# Patient Record
Sex: FEMALE | Race: WHITE | NOT HISPANIC OR LATINO | URBAN - METROPOLITAN AREA
[De-identification: names, ages, dates, MRNs, and addresses within clinical notes are randomized per-mention and may not be internally consistent; named-entity substitution may affect disease eponyms.]

---

## 2022-01-01 ENCOUNTER — INPATIENT (INPATIENT)
Facility: HOSPITAL | Age: 62
LOS: 8 days | End: 2022-04-19
Attending: HOSPITALIST | Admitting: HOSPITALIST
Payer: COMMERCIAL

## 2022-01-01 VITALS
RESPIRATION RATE: 18 BRPM | OXYGEN SATURATION: 99 % | DIASTOLIC BLOOD PRESSURE: 43 MMHG | HEART RATE: 104 BPM | SYSTOLIC BLOOD PRESSURE: 78 MMHG

## 2022-01-01 VITALS
TEMPERATURE: 96 F | SYSTOLIC BLOOD PRESSURE: 106 MMHG | DIASTOLIC BLOOD PRESSURE: 53 MMHG | HEART RATE: 103 BPM | RESPIRATION RATE: 18 BRPM

## 2022-01-01 DIAGNOSIS — R52 PAIN, UNSPECIFIED: ICD-10-CM

## 2022-01-01 DIAGNOSIS — C25.9 MALIGNANT NEOPLASM OF PANCREAS, UNSPECIFIED: ICD-10-CM

## 2022-01-01 DIAGNOSIS — R06.03 ACUTE RESPIRATORY DISTRESS: ICD-10-CM

## 2022-01-01 DIAGNOSIS — Z51.5 ENCOUNTER FOR PALLIATIVE CARE: ICD-10-CM

## 2022-01-01 DIAGNOSIS — K91.89 OTHER POSTPROCEDURAL COMPLICATIONS AND DISORDERS OF DIGESTIVE SYSTEM: ICD-10-CM

## 2022-01-01 DIAGNOSIS — Z66 DO NOT RESUSCITATE: ICD-10-CM
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
## 2022-01-01 DIAGNOSIS — J98.11 ATELECTASIS: ICD-10-CM

## 2022-01-01 DIAGNOSIS — J90 PLEURAL EFFUSION, NOT ELSEWHERE CLASSIFIED: ICD-10-CM

## 2022-01-01 DIAGNOSIS — R77.8 OTHER SPECIFIED ABNORMALITIES OF PLASMA PROTEINS: ICD-10-CM

## 2022-01-01 DIAGNOSIS — E87.6 HYPOKALEMIA: ICD-10-CM

## 2022-01-01 DIAGNOSIS — D72.825 BANDEMIA: ICD-10-CM

## 2022-01-01 DIAGNOSIS — K52.9 NONINFECTIVE GASTROENTERITIS AND COLITIS, UNSPECIFIED: ICD-10-CM

## 2022-01-01 DIAGNOSIS — R74.02 ELEVATION OF LEVELS OF LACTIC ACID DEHYDROGENASE [LDH]: ICD-10-CM

## 2022-01-01 DIAGNOSIS — E83.42 HYPOMAGNESEMIA: ICD-10-CM

## 2022-01-01 DIAGNOSIS — R64 CACHEXIA: ICD-10-CM

## 2022-01-01 DIAGNOSIS — J81.1 CHRONIC PULMONARY EDEMA: ICD-10-CM

## 2022-01-01 DIAGNOSIS — E43 UNSPECIFIED SEVERE PROTEIN-CALORIE MALNUTRITION: ICD-10-CM

## 2022-01-01 DIAGNOSIS — C78.7 SECONDARY MALIGNANT NEOPLASM OF LIVER AND INTRAHEPATIC BILE DUCT: ICD-10-CM

## 2022-01-01 DIAGNOSIS — E87.8 OTHER DISORDERS OF ELECTROLYTE AND FLUID BALANCE, NOT ELSEWHERE CLASSIFIED: ICD-10-CM

## 2022-01-01 DIAGNOSIS — D69.6 THROMBOCYTOPENIA, UNSPECIFIED: ICD-10-CM

## 2022-01-01 DIAGNOSIS — H40.9 UNSPECIFIED GLAUCOMA: ICD-10-CM

## 2022-01-01 DIAGNOSIS — K63.1 PERFORATION OF INTESTINE (NONTRAUMATIC): ICD-10-CM

## 2022-01-01 DIAGNOSIS — R18.8 OTHER ASCITES: ICD-10-CM
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
## 2022-01-01 DIAGNOSIS — K66.8 OTHER SPECIFIED DISORDERS OF PERITONEUM: ICD-10-CM

## 2022-01-01 DIAGNOSIS — E87.0 HYPEROSMOLALITY AND HYPERNATREMIA: ICD-10-CM

## 2022-01-01 DIAGNOSIS — Z71.89 OTHER SPECIFIED COUNSELING: ICD-10-CM

## 2022-01-01 LAB
ALBUMIN SERPL ELPH-MCNC: 2.4 G/DL — LOW (ref 3.5–5.2)
ALBUMIN SERPL ELPH-MCNC: 2.5 G/DL — LOW (ref 3.5–5.2)
ALBUMIN SERPL ELPH-MCNC: 2.6 G/DL — LOW (ref 3.5–5.2)
ALBUMIN SERPL ELPH-MCNC: 2.7 G/DL — LOW (ref 3.5–5.2)
ALP SERPL-CCNC: 116 U/L — HIGH (ref 30–115)
ALP SERPL-CCNC: 134 U/L — HIGH (ref 30–115)
ALP SERPL-CCNC: 157 U/L — HIGH (ref 30–115)
ALP SERPL-CCNC: 166 U/L — HIGH (ref 30–115)
ALT FLD-CCNC: 60 U/L — HIGH (ref 0–41)
ALT FLD-CCNC: 69 U/L — HIGH (ref 0–41)
ALT FLD-CCNC: 70 U/L — HIGH (ref 0–41)
ALT FLD-CCNC: 77 U/L — HIGH (ref 0–41)
ANION GAP SERPL CALC-SCNC: 12 MMOL/L — SIGNIFICANT CHANGE UP (ref 7–14)
ANION GAP SERPL CALC-SCNC: 13 MMOL/L — SIGNIFICANT CHANGE UP (ref 7–14)
ANION GAP SERPL CALC-SCNC: 15 MMOL/L — HIGH (ref 7–14)
ANION GAP SERPL CALC-SCNC: 18 MMOL/L — HIGH (ref 7–14)
APPEARANCE UR: CLEAR — SIGNIFICANT CHANGE UP
APTT BLD: 26 SEC — LOW (ref 27–39.2)
APTT BLD: 27.7 SEC — SIGNIFICANT CHANGE UP (ref 27–39.2)
APTT BLD: 29.3 SEC — SIGNIFICANT CHANGE UP (ref 27–39.2)
AST SERPL-CCNC: 23 U/L — SIGNIFICANT CHANGE UP (ref 0–41)
AST SERPL-CCNC: 26 U/L — SIGNIFICANT CHANGE UP (ref 0–41)
AST SERPL-CCNC: 35 U/L — SIGNIFICANT CHANGE UP (ref 0–41)
AST SERPL-CCNC: 67 U/L — HIGH (ref 0–41)
BACTERIA # UR AUTO: ABNORMAL
BASE EXCESS BLDA CALC-SCNC: -4 MMOL/L — LOW (ref -2–3)
BASE EXCESS BLDV CALC-SCNC: -3.1 MMOL/L — LOW (ref -2–3)
BASE EXCESS BLDV CALC-SCNC: -4.3 MMOL/L — LOW (ref -2–3)
BASOPHILS # BLD AUTO: 0.06 K/UL — SIGNIFICANT CHANGE UP (ref 0–0.2)
BASOPHILS # BLD AUTO: 0.07 K/UL — SIGNIFICANT CHANGE UP (ref 0–0.2)
BASOPHILS NFR BLD AUTO: 0.3 % — SIGNIFICANT CHANGE UP (ref 0–1)
BASOPHILS NFR BLD AUTO: 0.6 % — SIGNIFICANT CHANGE UP (ref 0–1)
BILIRUB SERPL-MCNC: 1.3 MG/DL — HIGH (ref 0.2–1.2)
BILIRUB SERPL-MCNC: 1.4 MG/DL — HIGH (ref 0.2–1.2)
BILIRUB SERPL-MCNC: 1.8 MG/DL — HIGH (ref 0.2–1.2)
BILIRUB SERPL-MCNC: 1.9 MG/DL — HIGH (ref 0.2–1.2)
BILIRUB UR-MCNC: ABNORMAL
BLD GP AB SCN SERPL QL: SIGNIFICANT CHANGE UP
BUN SERPL-MCNC: 11 MG/DL — SIGNIFICANT CHANGE UP (ref 10–20)
BUN SERPL-MCNC: 18 MG/DL — SIGNIFICANT CHANGE UP (ref 10–20)
BUN SERPL-MCNC: 29 MG/DL — HIGH (ref 10–20)
BUN SERPL-MCNC: 54 MG/DL — HIGH (ref 10–20)
BUN SERPL-MCNC: 82 MG/DL — CRITICAL HIGH (ref 10–20)
BUN SERPL-MCNC: 90 MG/DL — CRITICAL HIGH (ref 10–20)
BURR CELLS BLD QL SMEAR: PRESENT — SIGNIFICANT CHANGE UP
C DIFF BY PCR RESULT: NEGATIVE — SIGNIFICANT CHANGE UP
C DIFF TOX GENS STL QL NAA+PROBE: SIGNIFICANT CHANGE UP
CA-I SERPL-SCNC: 1.07 MMOL/L — LOW (ref 1.15–1.33)
CALCIUM SERPL-MCNC: 7.8 MG/DL — LOW (ref 8.5–10.1)
CALCIUM SERPL-MCNC: 7.9 MG/DL — LOW (ref 8.5–10.1)
CALCIUM SERPL-MCNC: 7.9 MG/DL — LOW (ref 8.5–10.1)
CALCIUM SERPL-MCNC: 8 MG/DL — LOW (ref 8.5–10.1)
CALCIUM SERPL-MCNC: 8.2 MG/DL — LOW (ref 8.5–10.1)
CALCIUM SERPL-MCNC: 8.4 MG/DL — LOW (ref 8.5–10.1)
CHLORIDE SERPL-SCNC: 107 MMOL/L — SIGNIFICANT CHANGE UP (ref 98–110)
CHLORIDE SERPL-SCNC: 112 MMOL/L — HIGH (ref 98–110)
CHLORIDE SERPL-SCNC: 116 MMOL/L — HIGH (ref 98–110)
CHLORIDE SERPL-SCNC: 117 MMOL/L — HIGH (ref 98–110)
CHLORIDE SERPL-SCNC: 118 MMOL/L — HIGH (ref 98–110)
CHLORIDE SERPL-SCNC: 99 MMOL/L — SIGNIFICANT CHANGE UP (ref 98–110)
CO2 SERPL-SCNC: 18 MMOL/L — SIGNIFICANT CHANGE UP (ref 17–32)
CO2 SERPL-SCNC: 18 MMOL/L — SIGNIFICANT CHANGE UP (ref 17–32)
CO2 SERPL-SCNC: 19 MMOL/L — SIGNIFICANT CHANGE UP (ref 17–32)
CO2 SERPL-SCNC: 20 MMOL/L — SIGNIFICANT CHANGE UP (ref 17–32)
CO2 SERPL-SCNC: 20 MMOL/L — SIGNIFICANT CHANGE UP (ref 17–32)
CO2 SERPL-SCNC: 21 MMOL/L — SIGNIFICANT CHANGE UP (ref 17–32)
COLOR SPEC: YELLOW — SIGNIFICANT CHANGE UP
CREAT SERPL-MCNC: 0.5 MG/DL — LOW (ref 0.7–1.5)
CREAT SERPL-MCNC: 0.6 MG/DL — LOW (ref 0.7–1.5)
CREAT SERPL-MCNC: 0.6 MG/DL — LOW (ref 0.7–1.5)
CREAT SERPL-MCNC: 0.7 MG/DL — SIGNIFICANT CHANGE UP (ref 0.7–1.5)
CREAT SERPL-MCNC: 1 MG/DL — SIGNIFICANT CHANGE UP (ref 0.7–1.5)
CREAT SERPL-MCNC: 3 MG/DL — HIGH (ref 0.7–1.5)
CULTURE RESULTS: SIGNIFICANT CHANGE UP
CULTURE RESULTS: SIGNIFICANT CHANGE UP
DIFF PNL FLD: ABNORMAL
DIR ANTIGLOB POLYSPECIFIC INTERPRETATION: SIGNIFICANT CHANGE UP
EGFR: 100 ML/MIN/1.73M2 — SIGNIFICANT CHANGE UP
EGFR: 100 ML/MIN/1.73M2 — SIGNIFICANT CHANGE UP
EGFR: 105 ML/MIN/1.73M2 — SIGNIFICANT CHANGE UP
EGFR: 17 ML/MIN/1.73M2 — LOW
EGFR: 63 ML/MIN/1.73M2 — SIGNIFICANT CHANGE UP
EGFR: 97 ML/MIN/1.73M2 — SIGNIFICANT CHANGE UP
EOSINOPHIL # BLD AUTO: 0.01 K/UL — SIGNIFICANT CHANGE UP (ref 0–0.7)
EOSINOPHIL # BLD AUTO: 0.04 K/UL — SIGNIFICANT CHANGE UP (ref 0–0.7)
EOSINOPHIL NFR BLD AUTO: 0.1 % — SIGNIFICANT CHANGE UP (ref 0–8)
EOSINOPHIL NFR BLD AUTO: 0.2 % — SIGNIFICANT CHANGE UP (ref 0–8)
EPI CELLS # UR: ABNORMAL /HPF
FERRITIN SERPL-MCNC: 914 NG/ML — HIGH (ref 15–150)
GAS PNL BLDV: 134 MMOL/L — LOW (ref 136–145)
GAS PNL BLDV: SIGNIFICANT CHANGE UP
GLUCOSE SERPL-MCNC: 102 MG/DL — HIGH (ref 70–99)
GLUCOSE SERPL-MCNC: 121 MG/DL — HIGH (ref 70–99)
GLUCOSE SERPL-MCNC: 125 MG/DL — HIGH (ref 70–99)
GLUCOSE SERPL-MCNC: 137 MG/DL — HIGH (ref 70–99)
GLUCOSE SERPL-MCNC: 154 MG/DL — HIGH (ref 70–99)
GLUCOSE SERPL-MCNC: 180 MG/DL — HIGH (ref 70–99)
GLUCOSE UR QL: 100 MG/DL
HAPTOGLOB SERPL-MCNC: 197 MG/DL — SIGNIFICANT CHANGE UP (ref 34–200)
HCO3 BLDA-SCNC: 20 MMOL/L — LOW (ref 21–28)
HCO3 BLDV-SCNC: 23 MMOL/L — SIGNIFICANT CHANGE UP (ref 22–29)
HCO3 BLDV-SCNC: 24 MMOL/L — SIGNIFICANT CHANGE UP (ref 22–29)
HCT VFR BLD CALC: 32.6 % — LOW (ref 37–47)
HCT VFR BLD CALC: 33.4 % — LOW (ref 37–47)
HCT VFR BLD CALC: 33.8 % — LOW (ref 37–47)
HCT VFR BLD CALC: 34.7 % — LOW (ref 37–47)
HCT VFR BLD CALC: 34.9 % — LOW (ref 37–47)
HCT VFR BLD CALC: 39.6 % — SIGNIFICANT CHANGE UP (ref 37–47)
HCT VFR BLDA CALC: 42 % — SIGNIFICANT CHANGE UP (ref 39–51)
HCV AB S/CO SERPL IA: 0.04 COI — SIGNIFICANT CHANGE UP
HCV AB SERPL-IMP: SIGNIFICANT CHANGE UP
HEPARIN-PF4 AB RESULT: <0.6 U/ML — SIGNIFICANT CHANGE UP (ref 0–0.9)
HGB BLD CALC-MCNC: 14 G/DL — SIGNIFICANT CHANGE UP (ref 12.6–17.4)
HGB BLD-MCNC: 10.7 G/DL — LOW (ref 12–16)
HGB BLD-MCNC: 10.9 G/DL — LOW (ref 12–16)
HGB BLD-MCNC: 10.9 G/DL — LOW (ref 12–16)
HGB BLD-MCNC: 11.3 G/DL — LOW (ref 12–16)
HGB BLD-MCNC: 11.4 G/DL — LOW (ref 12–16)
HGB BLD-MCNC: 12.6 G/DL — SIGNIFICANT CHANGE UP (ref 12–16)
HOROWITZ INDEX BLDA+IHG-RTO: 100 — SIGNIFICANT CHANGE UP
IMM GRANULOCYTES NFR BLD AUTO: 0.5 % — HIGH (ref 0.1–0.3)
IMM GRANULOCYTES NFR BLD AUTO: 2.1 % — HIGH (ref 0.1–0.3)
INR BLD: 1.76 RATIO — HIGH (ref 0.65–1.3)
INR BLD: 1.88 RATIO — HIGH (ref 0.65–1.3)
INR BLD: 2 RATIO — HIGH (ref 0.65–1.3)
IRON SATN MFR SERPL: 37 % — SIGNIFICANT CHANGE UP (ref 15–50)
IRON SATN MFR SERPL: 59 UG/DL — SIGNIFICANT CHANGE UP (ref 35–150)
KETONES UR-MCNC: NEGATIVE — SIGNIFICANT CHANGE UP
LACTATE BLDV-MCNC: 3.9 MMOL/L — HIGH (ref 0.5–2)
LACTATE BLDV-MCNC: 4.7 MMOL/L — CRITICAL HIGH (ref 0.5–2)
LACTATE SERPL-SCNC: 1.1 MMOL/L — SIGNIFICANT CHANGE UP (ref 0.7–2)
LACTATE SERPL-SCNC: 1.8 MMOL/L — SIGNIFICANT CHANGE UP (ref 0.7–2)
LDH SERPL L TO P-CCNC: 545 — HIGH (ref 50–242)
LEUKOCYTE ESTERASE UR-ACNC: NEGATIVE — SIGNIFICANT CHANGE UP
LIDOCAIN IGE QN: 91 U/L — HIGH (ref 7–60)
LYMPHOCYTES # BLD AUTO: 0.59 K/UL — LOW (ref 1.2–3.4)
LYMPHOCYTES # BLD AUTO: 1.07 K/UL — LOW (ref 1.2–3.4)
LYMPHOCYTES # BLD AUTO: 4.5 % — LOW (ref 20.5–51.1)
LYMPHOCYTES # BLD AUTO: 5.4 % — LOW (ref 20.5–51.1)
MAGNESIUM SERPL-MCNC: 1.6 MG/DL — LOW (ref 1.8–2.4)
MAGNESIUM SERPL-MCNC: 1.8 MG/DL — SIGNIFICANT CHANGE UP (ref 1.8–2.4)
MCHC RBC-ENTMCNC: 30.1 PG — SIGNIFICANT CHANGE UP (ref 27–31)
MCHC RBC-ENTMCNC: 30.2 PG — SIGNIFICANT CHANGE UP (ref 27–31)
MCHC RBC-ENTMCNC: 30.4 PG — SIGNIFICANT CHANGE UP (ref 27–31)
MCHC RBC-ENTMCNC: 30.4 PG — SIGNIFICANT CHANGE UP (ref 27–31)
MCHC RBC-ENTMCNC: 30.6 PG — SIGNIFICANT CHANGE UP (ref 27–31)
MCHC RBC-ENTMCNC: 30.7 PG — SIGNIFICANT CHANGE UP (ref 27–31)
MCHC RBC-ENTMCNC: 31.8 G/DL — LOW (ref 32–37)
MCHC RBC-ENTMCNC: 32.2 G/DL — SIGNIFICANT CHANGE UP (ref 32–37)
MCHC RBC-ENTMCNC: 32.4 G/DL — SIGNIFICANT CHANGE UP (ref 32–37)
MCHC RBC-ENTMCNC: 32.6 G/DL — SIGNIFICANT CHANGE UP (ref 32–37)
MCHC RBC-ENTMCNC: 32.8 G/DL — SIGNIFICANT CHANGE UP (ref 32–37)
MCHC RBC-ENTMCNC: 32.9 G/DL — SIGNIFICANT CHANGE UP (ref 32–37)
MCV RBC AUTO: 92 FL — SIGNIFICANT CHANGE UP (ref 81–99)
MCV RBC AUTO: 92.6 FL — SIGNIFICANT CHANGE UP (ref 81–99)
MCV RBC AUTO: 93.8 FL — SIGNIFICANT CHANGE UP (ref 81–99)
MCV RBC AUTO: 94.4 FL — SIGNIFICANT CHANGE UP (ref 81–99)
MCV RBC AUTO: 94.5 FL — SIGNIFICANT CHANGE UP (ref 81–99)
MCV RBC AUTO: 94.8 FL — SIGNIFICANT CHANGE UP (ref 81–99)
MONOCYTES # BLD AUTO: 0.48 K/UL — SIGNIFICANT CHANGE UP (ref 0.1–0.6)
MONOCYTES # BLD AUTO: 0.7 K/UL — HIGH (ref 0.1–0.6)
MONOCYTES NFR BLD AUTO: 2.9 % — SIGNIFICANT CHANGE UP (ref 1.7–9.3)
MONOCYTES NFR BLD AUTO: 4.4 % — SIGNIFICANT CHANGE UP (ref 1.7–9.3)
NEUTROPHILS # BLD AUTO: 21.43 K/UL — HIGH (ref 1.4–6.5)
NEUTROPHILS # BLD AUTO: 9.67 K/UL — HIGH (ref 1.4–6.5)
NEUTROPHILS NFR BLD AUTO: 89 % — HIGH (ref 42.2–75.2)
NEUTROPHILS NFR BLD AUTO: 90 % — HIGH (ref 42.2–75.2)
NEUTS BAND # BLD: 20 % — HIGH (ref 0–6)
NITRITE UR-MCNC: NEGATIVE — SIGNIFICANT CHANGE UP
NRBC # BLD: 0 /100 WBCS — SIGNIFICANT CHANGE UP (ref 0–0)
NRBC # BLD: 0 /100 — SIGNIFICANT CHANGE UP (ref 0–0)
PCO2 BLDA: 31 MMHG — LOW (ref 35–48)
PCO2 BLDV: 50 MMHG — HIGH (ref 39–42)
PCO2 BLDV: 52 MMHG — HIGH (ref 39–42)
PF4 HEPARIN CMPLX AB SER-ACNC: NEGATIVE — SIGNIFICANT CHANGE UP
PH BLDA: 7.41 — SIGNIFICANT CHANGE UP (ref 7.35–7.45)
PH BLDV: 7.26 — LOW (ref 7.32–7.43)
PH BLDV: 7.29 — LOW (ref 7.32–7.43)
PH UR: 6 — SIGNIFICANT CHANGE UP (ref 5–8)
PHOSPHATE SERPL-MCNC: 2.6 MG/DL — SIGNIFICANT CHANGE UP (ref 2.1–4.9)
PLAT MORPH BLD: NORMAL — SIGNIFICANT CHANGE UP
PLATELET # BLD AUTO: 124 K/UL — LOW (ref 130–400)
PLATELET # BLD AUTO: 23 K/UL — LOW (ref 130–400)
PLATELET # BLD AUTO: 27 K/UL — LOW (ref 130–400)
PLATELET # BLD AUTO: 43 K/UL — LOW (ref 130–400)
PLATELET # BLD AUTO: 53 K/UL — LOW (ref 130–400)
PLATELET # BLD AUTO: 75 K/UL — LOW (ref 130–400)
PO2 BLDA: 94 MMHG — SIGNIFICANT CHANGE UP (ref 83–108)
PO2 BLDV: 18 MMHG — SIGNIFICANT CHANGE UP
PO2 BLDV: 31 MMHG — SIGNIFICANT CHANGE UP
POTASSIUM BLDV-SCNC: 3.2 MMOL/L — LOW (ref 3.5–5.1)
POTASSIUM SERPL-MCNC: 3 MMOL/L — LOW (ref 3.5–5)
POTASSIUM SERPL-MCNC: 3.2 MMOL/L — LOW (ref 3.5–5)
POTASSIUM SERPL-MCNC: 3.4 MMOL/L — LOW (ref 3.5–5)
POTASSIUM SERPL-MCNC: 3.7 MMOL/L — SIGNIFICANT CHANGE UP (ref 3.5–5)
POTASSIUM SERPL-MCNC: 3.7 MMOL/L — SIGNIFICANT CHANGE UP (ref 3.5–5)
POTASSIUM SERPL-MCNC: 4.2 MMOL/L — SIGNIFICANT CHANGE UP (ref 3.5–5)
POTASSIUM SERPL-SCNC: 3 MMOL/L — LOW (ref 3.5–5)
POTASSIUM SERPL-SCNC: 3.2 MMOL/L — LOW (ref 3.5–5)
POTASSIUM SERPL-SCNC: 3.4 MMOL/L — LOW (ref 3.5–5)
POTASSIUM SERPL-SCNC: 3.7 MMOL/L — SIGNIFICANT CHANGE UP (ref 3.5–5)
POTASSIUM SERPL-SCNC: 3.7 MMOL/L — SIGNIFICANT CHANGE UP (ref 3.5–5)
POTASSIUM SERPL-SCNC: 4.2 MMOL/L — SIGNIFICANT CHANGE UP (ref 3.5–5)
PROT SERPL-MCNC: 4.4 G/DL — LOW (ref 6–8)
PROT SERPL-MCNC: 4.7 G/DL — LOW (ref 6–8)
PROT SERPL-MCNC: 5 G/DL — LOW (ref 6–8)
PROT SERPL-MCNC: 5.2 G/DL — LOW (ref 6–8)
PROT UR-MCNC: 100 MG/DL
PROTHROM AB SERPL-ACNC: 20.1 SEC — HIGH (ref 9.95–12.87)
PROTHROM AB SERPL-ACNC: 21.5 SEC — HIGH (ref 9.95–12.87)
PROTHROM AB SERPL-ACNC: 22.8 SEC — HIGH (ref 9.95–12.87)
RBC # BLD: 3.52 M/UL — LOW (ref 4.2–5.4)
RBC # BLD: 3.56 M/UL — LOW (ref 4.2–5.4)
RBC # BLD: 3.58 M/UL — LOW (ref 4.2–5.4)
RBC # BLD: 3.58 M/UL — LOW (ref 4.2–5.4)
RBC # BLD: 3.68 M/UL — LOW (ref 4.2–5.4)
RBC # BLD: 3.77 M/UL — LOW (ref 4.2–5.4)
RBC # BLD: 4.19 M/UL — LOW (ref 4.2–5.4)
RBC # FLD: 14 % — SIGNIFICANT CHANGE UP (ref 11.5–14.5)
RBC # FLD: 14 % — SIGNIFICANT CHANGE UP (ref 11.5–14.5)
RBC # FLD: 14.1 % — SIGNIFICANT CHANGE UP (ref 11.5–14.5)
RBC # FLD: 14.2 % — SIGNIFICANT CHANGE UP (ref 11.5–14.5)
RBC # FLD: 14.2 % — SIGNIFICANT CHANGE UP (ref 11.5–14.5)
RBC # FLD: 14.6 % — HIGH (ref 11.5–14.5)
RBC BLD AUTO: ABNORMAL
RBC CASTS # UR COMP ASSIST: ABNORMAL /HPF
RETICS #: 59.8 K/UL — SIGNIFICANT CHANGE UP (ref 25–125)
RETICS/RBC NFR: 1.7 % — HIGH (ref 0.5–1.5)
SAO2 % BLDA: 99.1 % — HIGH (ref 94–98)
SAO2 % BLDV: 19.5 % — SIGNIFICANT CHANGE UP
SAO2 % BLDV: 44.5 % — SIGNIFICANT CHANGE UP
SARS-COV-2 RNA SPEC QL NAA+PROBE: SIGNIFICANT CHANGE UP
SODIUM SERPL-SCNC: 137 MMOL/L — SIGNIFICANT CHANGE UP (ref 135–146)
SODIUM SERPL-SCNC: 139 MMOL/L — SIGNIFICANT CHANGE UP (ref 135–146)
SODIUM SERPL-SCNC: 145 MMOL/L — SIGNIFICANT CHANGE UP (ref 135–146)
SODIUM SERPL-SCNC: 147 MMOL/L — HIGH (ref 135–146)
SODIUM SERPL-SCNC: 148 MMOL/L — HIGH (ref 135–146)
SODIUM SERPL-SCNC: 151 MMOL/L — HIGH (ref 135–146)
SP GR SPEC: 1.01 — SIGNIFICANT CHANGE UP (ref 1.01–1.03)
SPECIMEN SOURCE: SIGNIFICANT CHANGE UP
SPECIMEN SOURCE: SIGNIFICANT CHANGE UP
SRA INTERP SER-IMP: SIGNIFICANT CHANGE UP
TIBC SERPL-MCNC: 159 UG/DL — LOW (ref 220–430)
TROPONIN T SERPL-MCNC: 0.02 NG/ML — HIGH
UIBC SERPL-MCNC: 100 UG/DL — LOW (ref 110–370)
UROBILINOGEN FLD QL: 0.2 MG/DL — SIGNIFICANT CHANGE UP
VARIANT LYMPHS # BLD: 3 % — SIGNIFICANT CHANGE UP (ref 0–5)
WBC # BLD: 10.86 K/UL — HIGH (ref 4.8–10.8)
WBC # BLD: 11.36 K/UL — HIGH (ref 4.8–10.8)
WBC # BLD: 12.02 K/UL — HIGH (ref 4.8–10.8)
WBC # BLD: 13.68 K/UL — HIGH (ref 4.8–10.8)
WBC # BLD: 19.91 K/UL — HIGH (ref 4.8–10.8)
WBC # BLD: 23.8 K/UL — HIGH (ref 4.8–10.8)
WBC # FLD AUTO: 10.86 K/UL — HIGH (ref 4.8–10.8)
WBC # FLD AUTO: 11.36 K/UL — HIGH (ref 4.8–10.8)
WBC # FLD AUTO: 12.02 K/UL — HIGH (ref 4.8–10.8)
WBC # FLD AUTO: 13.68 K/UL — HIGH (ref 4.8–10.8)
WBC # FLD AUTO: 19.91 K/UL — HIGH (ref 4.8–10.8)
WBC # FLD AUTO: 23.8 K/UL — HIGH (ref 4.8–10.8)
WBC UR QL: ABNORMAL /HPF

## 2022-01-01 PROCEDURE — 74176 CT ABD & PELVIS W/O CONTRAST: CPT | Mod: 26

## 2022-01-01 PROCEDURE — 99223 1ST HOSP IP/OBS HIGH 75: CPT

## 2022-01-01 PROCEDURE — 99232 SBSQ HOSP IP/OBS MODERATE 35: CPT

## 2022-01-01 PROCEDURE — 78306 BONE IMAGING WHOLE BODY: CPT | Mod: 26

## 2022-01-01 PROCEDURE — 74018 RADEX ABDOMEN 1 VIEW: CPT | Mod: 26

## 2022-01-01 PROCEDURE — 71045 X-RAY EXAM CHEST 1 VIEW: CPT | Mod: 26

## 2022-01-01 PROCEDURE — 71260 CT THORAX DX C+: CPT | Mod: 26

## 2022-01-01 PROCEDURE — 99285 EMERGENCY DEPT VISIT HI MDM: CPT

## 2022-01-01 PROCEDURE — 99222 1ST HOSP IP/OBS MODERATE 55: CPT

## 2022-01-01 PROCEDURE — 99221 1ST HOSP IP/OBS SF/LOW 40: CPT

## 2022-01-01 PROCEDURE — 93970 EXTREMITY STUDY: CPT | Mod: 26

## 2022-01-01 PROCEDURE — 93010 ELECTROCARDIOGRAM REPORT: CPT

## 2022-01-01 PROCEDURE — 99233 SBSQ HOSP IP/OBS HIGH 50: CPT

## 2022-01-01 PROCEDURE — 78803 RP LOCLZJ TUM SPECT 1 AREA: CPT | Mod: 26

## 2022-01-01 PROCEDURE — 74177 CT ABD & PELVIS W/CONTRAST: CPT | Mod: 26,MA

## 2022-01-01 RX ORDER — MAGNESIUM SULFATE 500 MG/ML
2 VIAL (ML) INJECTION ONCE
Refills: 0 | Status: COMPLETED | OUTPATIENT
Start: 2022-01-01 | End: 2022-01-01

## 2022-01-01 RX ORDER — HYDROMORPHONE HYDROCHLORIDE 2 MG/ML
1 INJECTION INTRAMUSCULAR; INTRAVENOUS; SUBCUTANEOUS EVERY 4 HOURS
Refills: 0 | Status: DISCONTINUED | OUTPATIENT
Start: 2022-01-01 | End: 2022-01-01

## 2022-01-01 RX ORDER — CEFEPIME 1 G/1
2000 INJECTION, POWDER, FOR SOLUTION INTRAMUSCULAR; INTRAVENOUS ONCE
Refills: 0 | Status: COMPLETED | OUTPATIENT
Start: 2022-01-01 | End: 2022-01-01

## 2022-01-01 RX ORDER — VANCOMYCIN HCL 1 G
1000 VIAL (EA) INTRAVENOUS ONCE
Refills: 0 | Status: COMPLETED | OUTPATIENT
Start: 2022-01-01 | End: 2022-01-01

## 2022-01-01 RX ORDER — PANTOPRAZOLE SODIUM 20 MG/1
40 TABLET, DELAYED RELEASE ORAL EVERY 12 HOURS
Refills: 0 | Status: DISCONTINUED | OUTPATIENT
Start: 2022-01-01 | End: 2022-01-01

## 2022-01-01 RX ORDER — IOHEXOL 300 MG/ML
30 INJECTION, SOLUTION INTRAVENOUS ONCE
Refills: 0 | Status: COMPLETED | OUTPATIENT
Start: 2022-01-01 | End: 2022-01-01

## 2022-01-01 RX ORDER — TEMAZEPAM 15 MG/1
15 CAPSULE ORAL AT BEDTIME
Refills: 0 | Status: DISCONTINUED | OUTPATIENT
Start: 2022-01-01 | End: 2022-01-01

## 2022-01-01 RX ORDER — ONDANSETRON 8 MG/1
4 TABLET, FILM COATED ORAL EVERY 6 HOURS
Refills: 0 | Status: DISCONTINUED | OUTPATIENT
Start: 2022-01-01 | End: 2022-01-01

## 2022-01-01 RX ORDER — LATANOPROST 0.05 MG/ML
1 SOLUTION/ DROPS OPHTHALMIC; TOPICAL AT BEDTIME
Refills: 0 | Status: DISCONTINUED | OUTPATIENT
Start: 2022-01-01 | End: 2022-01-01

## 2022-01-01 RX ORDER — SODIUM CHLORIDE 9 MG/ML
1000 INJECTION INTRAMUSCULAR; INTRAVENOUS; SUBCUTANEOUS ONCE
Refills: 0 | Status: COMPLETED | OUTPATIENT
Start: 2022-01-01 | End: 2022-01-01

## 2022-01-01 RX ORDER — FUROSEMIDE 40 MG
40 TABLET ORAL ONCE
Refills: 0 | Status: COMPLETED | OUTPATIENT
Start: 2022-01-01 | End: 2022-01-01

## 2022-01-01 RX ORDER — POTASSIUM CHLORIDE 20 MEQ
20 PACKET (EA) ORAL
Refills: 0 | Status: COMPLETED | OUTPATIENT
Start: 2022-01-01 | End: 2022-01-01

## 2022-01-01 RX ORDER — VANCOMYCIN HCL 1 G
750 VIAL (EA) INTRAVENOUS EVERY 24 HOURS
Refills: 0 | Status: DISCONTINUED | OUTPATIENT
Start: 2022-01-01 | End: 2022-01-01

## 2022-01-01 RX ORDER — SODIUM CHLORIDE 9 MG/ML
1000 INJECTION INTRAMUSCULAR; INTRAVENOUS; SUBCUTANEOUS
Refills: 0 | Status: DISCONTINUED | OUTPATIENT
Start: 2022-01-01 | End: 2022-01-01

## 2022-01-01 RX ORDER — MORPHINE SULFATE 50 MG/1
2 CAPSULE, EXTENDED RELEASE ORAL ONCE
Refills: 0 | Status: DISCONTINUED | OUTPATIENT
Start: 2022-01-01 | End: 2022-01-01

## 2022-01-01 RX ORDER — HYDROMORPHONE HYDROCHLORIDE 2 MG/ML
2 INJECTION INTRAMUSCULAR; INTRAVENOUS; SUBCUTANEOUS EVERY 6 HOURS
Refills: 0 | Status: DISCONTINUED | OUTPATIENT
Start: 2022-01-01 | End: 2022-01-01

## 2022-01-01 RX ORDER — HYDROMORPHONE HYDROCHLORIDE 2 MG/ML
1 INJECTION INTRAMUSCULAR; INTRAVENOUS; SUBCUTANEOUS
Refills: 0 | Status: DISCONTINUED | OUTPATIENT
Start: 2022-01-01 | End: 2022-01-01

## 2022-01-01 RX ORDER — SODIUM CHLORIDE 9 MG/ML
1000 INJECTION, SOLUTION INTRAVENOUS
Refills: 0 | Status: DISCONTINUED | OUTPATIENT
Start: 2022-01-01 | End: 2022-01-01

## 2022-01-01 RX ORDER — TEMAZEPAM 15 MG/1
7.5 CAPSULE ORAL AT BEDTIME
Refills: 0 | Status: DISCONTINUED | OUTPATIENT
Start: 2022-01-01 | End: 2022-01-01

## 2022-01-01 RX ORDER — CEFEPIME 1 G/1
1000 INJECTION, POWDER, FOR SOLUTION INTRAMUSCULAR; INTRAVENOUS EVERY 8 HOURS
Refills: 0 | Status: DISCONTINUED | OUTPATIENT
Start: 2022-01-01 | End: 2022-01-01

## 2022-01-01 RX ORDER — CEFEPIME 1 G/1
1000 INJECTION, POWDER, FOR SOLUTION INTRAMUSCULAR; INTRAVENOUS EVERY 12 HOURS
Refills: 0 | Status: DISCONTINUED | OUTPATIENT
Start: 2022-01-01 | End: 2022-01-01

## 2022-01-01 RX ORDER — PIPERACILLIN AND TAZOBACTAM 4; .5 G/20ML; G/20ML
3.38 INJECTION, POWDER, LYOPHILIZED, FOR SOLUTION INTRAVENOUS EVERY 12 HOURS
Refills: 0 | Status: DISCONTINUED | OUTPATIENT
Start: 2022-01-01 | End: 2022-01-01

## 2022-01-01 RX ADMIN — PIPERACILLIN AND TAZOBACTAM 25 GRAM(S): 4; .5 INJECTION, POWDER, LYOPHILIZED, FOR SOLUTION INTRAVENOUS at 05:27

## 2022-01-01 RX ADMIN — SODIUM CHLORIDE 1000 MILLILITER(S): 9 INJECTION INTRAMUSCULAR; INTRAVENOUS; SUBCUTANEOUS at 22:07

## 2022-01-01 RX ADMIN — HYDROMORPHONE HYDROCHLORIDE 1 MILLIGRAM(S): 2 INJECTION INTRAMUSCULAR; INTRAVENOUS; SUBCUTANEOUS at 00:45

## 2022-01-01 RX ADMIN — SODIUM CHLORIDE 1000 MILLILITER(S): 9 INJECTION INTRAMUSCULAR; INTRAVENOUS; SUBCUTANEOUS at 18:50

## 2022-01-01 RX ADMIN — HYDROMORPHONE HYDROCHLORIDE 1 MILLIGRAM(S): 2 INJECTION INTRAMUSCULAR; INTRAVENOUS; SUBCUTANEOUS at 10:44

## 2022-01-01 RX ADMIN — HYDROMORPHONE HYDROCHLORIDE 1 MILLIGRAM(S): 2 INJECTION INTRAMUSCULAR; INTRAVENOUS; SUBCUTANEOUS at 02:58

## 2022-01-01 RX ADMIN — HYDROMORPHONE HYDROCHLORIDE 1 MILLIGRAM(S): 2 INJECTION INTRAMUSCULAR; INTRAVENOUS; SUBCUTANEOUS at 01:59

## 2022-01-01 RX ADMIN — CEFEPIME 100 MILLIGRAM(S): 1 INJECTION, POWDER, FOR SOLUTION INTRAMUSCULAR; INTRAVENOUS at 22:07

## 2022-01-01 RX ADMIN — Medication 50 MILLIEQUIVALENT(S): at 22:28

## 2022-01-01 RX ADMIN — PIPERACILLIN AND TAZOBACTAM 25 GRAM(S): 4; .5 INJECTION, POWDER, LYOPHILIZED, FOR SOLUTION INTRAVENOUS at 17:58

## 2022-01-01 RX ADMIN — LATANOPROST 1 DROP(S): 0.05 SOLUTION/ DROPS OPHTHALMIC; TOPICAL at 22:21

## 2022-01-01 RX ADMIN — HYDROMORPHONE HYDROCHLORIDE 1 MILLIGRAM(S): 2 INJECTION INTRAMUSCULAR; INTRAVENOUS; SUBCUTANEOUS at 09:20

## 2022-01-01 RX ADMIN — PIPERACILLIN AND TAZOBACTAM 25 GRAM(S): 4; .5 INJECTION, POWDER, LYOPHILIZED, FOR SOLUTION INTRAVENOUS at 17:10

## 2022-01-01 RX ADMIN — PIPERACILLIN AND TAZOBACTAM 25 GRAM(S): 4; .5 INJECTION, POWDER, LYOPHILIZED, FOR SOLUTION INTRAVENOUS at 06:18

## 2022-01-01 RX ADMIN — SODIUM CHLORIDE 1000 MILLILITER(S): 9 INJECTION INTRAMUSCULAR; INTRAVENOUS; SUBCUTANEOUS at 21:00

## 2022-01-01 RX ADMIN — HYDROMORPHONE HYDROCHLORIDE 1 MILLIGRAM(S): 2 INJECTION INTRAMUSCULAR; INTRAVENOUS; SUBCUTANEOUS at 03:02

## 2022-01-01 RX ADMIN — HYDROMORPHONE HYDROCHLORIDE 1 MILLIGRAM(S): 2 INJECTION INTRAMUSCULAR; INTRAVENOUS; SUBCUTANEOUS at 18:03

## 2022-01-01 RX ADMIN — PIPERACILLIN AND TAZOBACTAM 25 GRAM(S): 4; .5 INJECTION, POWDER, LYOPHILIZED, FOR SOLUTION INTRAVENOUS at 05:42

## 2022-01-01 RX ADMIN — Medication 50 MILLIEQUIVALENT(S): at 13:02

## 2022-01-01 RX ADMIN — SODIUM CHLORIDE 100 MILLILITER(S): 9 INJECTION INTRAMUSCULAR; INTRAVENOUS; SUBCUTANEOUS at 06:18

## 2022-01-01 RX ADMIN — HYDROMORPHONE HYDROCHLORIDE 1 MILLIGRAM(S): 2 INJECTION INTRAMUSCULAR; INTRAVENOUS; SUBCUTANEOUS at 17:52

## 2022-01-01 RX ADMIN — PIPERACILLIN AND TAZOBACTAM 25 GRAM(S): 4; .5 INJECTION, POWDER, LYOPHILIZED, FOR SOLUTION INTRAVENOUS at 17:59

## 2022-01-01 RX ADMIN — Medication 50 MILLIEQUIVALENT(S): at 15:55

## 2022-01-01 RX ADMIN — PANTOPRAZOLE SODIUM 40 MILLIGRAM(S): 20 TABLET, DELAYED RELEASE ORAL at 17:45

## 2022-01-01 RX ADMIN — SODIUM CHLORIDE 1000 MILLILITER(S): 9 INJECTION INTRAMUSCULAR; INTRAVENOUS; SUBCUTANEOUS at 19:47

## 2022-01-01 RX ADMIN — LATANOPROST 1 DROP(S): 0.05 SOLUTION/ DROPS OPHTHALMIC; TOPICAL at 21:48

## 2022-01-01 RX ADMIN — PANTOPRAZOLE SODIUM 40 MILLIGRAM(S): 20 TABLET, DELAYED RELEASE ORAL at 17:59

## 2022-01-01 RX ADMIN — Medication 250 MILLIGRAM(S): at 22:32

## 2022-01-01 RX ADMIN — HYDROMORPHONE HYDROCHLORIDE 1 MILLIGRAM(S): 2 INJECTION INTRAMUSCULAR; INTRAVENOUS; SUBCUTANEOUS at 23:59

## 2022-01-01 RX ADMIN — HYDROMORPHONE HYDROCHLORIDE 1 MILLIGRAM(S): 2 INJECTION INTRAMUSCULAR; INTRAVENOUS; SUBCUTANEOUS at 06:57

## 2022-01-01 RX ADMIN — HYDROMORPHONE HYDROCHLORIDE 2 MILLIGRAM(S): 2 INJECTION INTRAMUSCULAR; INTRAVENOUS; SUBCUTANEOUS at 04:16

## 2022-01-01 RX ADMIN — SODIUM CHLORIDE 1000 MILLILITER(S): 9 INJECTION INTRAMUSCULAR; INTRAVENOUS; SUBCUTANEOUS at 02:00

## 2022-01-01 RX ADMIN — PANTOPRAZOLE SODIUM 40 MILLIGRAM(S): 20 TABLET, DELAYED RELEASE ORAL at 05:22

## 2022-01-01 RX ADMIN — HYDROMORPHONE HYDROCHLORIDE 1 MILLIGRAM(S): 2 INJECTION INTRAMUSCULAR; INTRAVENOUS; SUBCUTANEOUS at 15:15

## 2022-01-01 RX ADMIN — HYDROMORPHONE HYDROCHLORIDE 2 MILLIGRAM(S): 2 INJECTION INTRAMUSCULAR; INTRAVENOUS; SUBCUTANEOUS at 20:04

## 2022-01-01 RX ADMIN — PIPERACILLIN AND TAZOBACTAM 25 GRAM(S): 4; .5 INJECTION, POWDER, LYOPHILIZED, FOR SOLUTION INTRAVENOUS at 17:41

## 2022-01-01 RX ADMIN — Medication 25 GRAM(S): at 13:02

## 2022-01-01 RX ADMIN — SODIUM CHLORIDE 100 MILLILITER(S): 9 INJECTION INTRAMUSCULAR; INTRAVENOUS; SUBCUTANEOUS at 17:57

## 2022-01-01 RX ADMIN — PIPERACILLIN AND TAZOBACTAM 25 GRAM(S): 4; .5 INJECTION, POWDER, LYOPHILIZED, FOR SOLUTION INTRAVENOUS at 05:15

## 2022-01-01 RX ADMIN — HYDROMORPHONE HYDROCHLORIDE 1 MILLIGRAM(S): 2 INJECTION INTRAMUSCULAR; INTRAVENOUS; SUBCUTANEOUS at 13:02

## 2022-01-01 RX ADMIN — HYDROMORPHONE HYDROCHLORIDE 2 MILLIGRAM(S): 2 INJECTION INTRAMUSCULAR; INTRAVENOUS; SUBCUTANEOUS at 21:34

## 2022-01-01 RX ADMIN — PANTOPRAZOLE SODIUM 40 MILLIGRAM(S): 20 TABLET, DELAYED RELEASE ORAL at 05:16

## 2022-01-01 RX ADMIN — MORPHINE SULFATE 2 MILLIGRAM(S): 50 CAPSULE, EXTENDED RELEASE ORAL at 06:44

## 2022-01-01 RX ADMIN — HYDROMORPHONE HYDROCHLORIDE 1 MILLIGRAM(S): 2 INJECTION INTRAMUSCULAR; INTRAVENOUS; SUBCUTANEOUS at 19:57

## 2022-01-01 RX ADMIN — Medication 50 MILLIEQUIVALENT(S): at 16:45

## 2022-01-01 RX ADMIN — PANTOPRAZOLE SODIUM 40 MILLIGRAM(S): 20 TABLET, DELAYED RELEASE ORAL at 05:27

## 2022-01-01 RX ADMIN — HYDROMORPHONE HYDROCHLORIDE 1 MILLIGRAM(S): 2 INJECTION INTRAMUSCULAR; INTRAVENOUS; SUBCUTANEOUS at 10:59

## 2022-01-01 RX ADMIN — PANTOPRAZOLE SODIUM 40 MILLIGRAM(S): 20 TABLET, DELAYED RELEASE ORAL at 05:42

## 2022-01-01 RX ADMIN — SODIUM CHLORIDE 50 MILLILITER(S): 9 INJECTION, SOLUTION INTRAVENOUS at 18:05

## 2022-01-01 RX ADMIN — Medication 40 MILLIGRAM(S): at 06:25

## 2022-01-01 RX ADMIN — LATANOPROST 1 DROP(S): 0.05 SOLUTION/ DROPS OPHTHALMIC; TOPICAL at 22:30

## 2022-01-01 RX ADMIN — HYDROMORPHONE HYDROCHLORIDE 2 MILLIGRAM(S): 2 INJECTION INTRAMUSCULAR; INTRAVENOUS; SUBCUTANEOUS at 19:57

## 2022-01-01 RX ADMIN — HYDROMORPHONE HYDROCHLORIDE 1 MILLIGRAM(S): 2 INJECTION INTRAMUSCULAR; INTRAVENOUS; SUBCUTANEOUS at 11:36

## 2022-01-01 RX ADMIN — PANTOPRAZOLE SODIUM 40 MILLIGRAM(S): 20 TABLET, DELAYED RELEASE ORAL at 18:00

## 2022-01-01 RX ADMIN — HYDROMORPHONE HYDROCHLORIDE 1 MILLIGRAM(S): 2 INJECTION INTRAMUSCULAR; INTRAVENOUS; SUBCUTANEOUS at 01:57

## 2022-01-01 RX ADMIN — Medication 50 MILLIEQUIVALENT(S): at 18:48

## 2022-01-01 RX ADMIN — SODIUM CHLORIDE 100 MILLILITER(S): 9 INJECTION INTRAMUSCULAR; INTRAVENOUS; SUBCUTANEOUS at 07:30

## 2022-01-01 RX ADMIN — Medication 50 MILLIEQUIVALENT(S): at 14:01

## 2022-01-01 RX ADMIN — IOHEXOL 30 MILLILITER(S): 300 INJECTION, SOLUTION INTRAVENOUS at 03:35

## 2022-01-01 RX ADMIN — MORPHINE SULFATE 2 MILLIGRAM(S): 50 CAPSULE, EXTENDED RELEASE ORAL at 06:32

## 2022-01-01 RX ADMIN — HYDROMORPHONE HYDROCHLORIDE 1 MILLIGRAM(S): 2 INJECTION INTRAMUSCULAR; INTRAVENOUS; SUBCUTANEOUS at 23:45

## 2022-01-01 RX ADMIN — SODIUM CHLORIDE 50 MILLILITER(S): 9 INJECTION, SOLUTION INTRAVENOUS at 20:06

## 2022-01-01 RX ADMIN — HYDROMORPHONE HYDROCHLORIDE 1 MILLIGRAM(S): 2 INJECTION INTRAMUSCULAR; INTRAVENOUS; SUBCUTANEOUS at 15:30

## 2022-01-01 RX ADMIN — LATANOPROST 1 DROP(S): 0.05 SOLUTION/ DROPS OPHTHALMIC; TOPICAL at 22:23

## 2022-01-01 RX ADMIN — HYDROMORPHONE HYDROCHLORIDE 2 MILLIGRAM(S): 2 INJECTION INTRAMUSCULAR; INTRAVENOUS; SUBCUTANEOUS at 01:47

## 2022-01-01 RX ADMIN — PANTOPRAZOLE SODIUM 40 MILLIGRAM(S): 20 TABLET, DELAYED RELEASE ORAL at 17:54

## 2022-01-01 RX ADMIN — HYDROMORPHONE HYDROCHLORIDE 1 MILLIGRAM(S): 2 INJECTION INTRAMUSCULAR; INTRAVENOUS; SUBCUTANEOUS at 09:35

## 2022-01-01 RX ADMIN — HYDROMORPHONE HYDROCHLORIDE 1 MILLIGRAM(S): 2 INJECTION INTRAMUSCULAR; INTRAVENOUS; SUBCUTANEOUS at 13:17

## 2022-01-01 RX ADMIN — SODIUM CHLORIDE 100 MILLILITER(S): 9 INJECTION INTRAMUSCULAR; INTRAVENOUS; SUBCUTANEOUS at 01:19

## 2022-01-01 RX ADMIN — PANTOPRAZOLE SODIUM 40 MILLIGRAM(S): 20 TABLET, DELAYED RELEASE ORAL at 17:41

## 2022-01-01 RX ADMIN — TEMAZEPAM 15 MILLIGRAM(S): 15 CAPSULE ORAL at 22:24

## 2022-01-01 RX ADMIN — Medication 25 GRAM(S): at 14:02

## 2022-01-01 RX ADMIN — HYDROMORPHONE HYDROCHLORIDE 1 MILLIGRAM(S): 2 INJECTION INTRAMUSCULAR; INTRAVENOUS; SUBCUTANEOUS at 01:10

## 2022-01-01 RX ADMIN — HYDROMORPHONE HYDROCHLORIDE 1 MILLIGRAM(S): 2 INJECTION INTRAMUSCULAR; INTRAVENOUS; SUBCUTANEOUS at 22:55

## 2022-01-01 RX ADMIN — PIPERACILLIN AND TAZOBACTAM 25 GRAM(S): 4; .5 INJECTION, POWDER, LYOPHILIZED, FOR SOLUTION INTRAVENOUS at 17:53

## 2022-01-01 RX ADMIN — PANTOPRAZOLE SODIUM 40 MILLIGRAM(S): 20 TABLET, DELAYED RELEASE ORAL at 17:10

## 2022-01-01 RX ADMIN — HYDROMORPHONE HYDROCHLORIDE 2 MILLIGRAM(S): 2 INJECTION INTRAMUSCULAR; INTRAVENOUS; SUBCUTANEOUS at 14:16

## 2022-01-01 RX ADMIN — LATANOPROST 1 DROP(S): 0.05 SOLUTION/ DROPS OPHTHALMIC; TOPICAL at 18:00

## 2022-01-01 RX ADMIN — PIPERACILLIN AND TAZOBACTAM 25 GRAM(S): 4; .5 INJECTION, POWDER, LYOPHILIZED, FOR SOLUTION INTRAVENOUS at 05:21

## 2022-04-10 NOTE — ED PROVIDER NOTE - PROGRESS NOTE DETAILS
WALT Cai aware of radiologist call the patient on CT scan has pneumoperitoneum and signs of bile leak will see patient in ER for stat surgical consultation. At this time patient is without abdominal pain abdomen is soft nontender patient is aware of CT findings and aware of surgical consult Spoke with surgery at this time they are requesting to do a p.o. CT to identify perforation admit to surgery service

## 2022-04-10 NOTE — ED PROVIDER NOTE - CARE PLAN
1 Principal Discharge DX:	Perforated bowel  Secondary Diagnosis:	Bandemia  Secondary Diagnosis:	Elevated troponin  Secondary Diagnosis:	Pneumoperitoneum  Secondary Diagnosis:	Elevated lactic acid level

## 2022-04-10 NOTE — ED ADULT NURSE NOTE - NSIMPLEMENTINTERV_GEN_ALL_ED
Implemented All Fall Risk Interventions:  [place] to call system. Call bell, personal items and telephone within reach. Instruct patient to call for assistance. Room bathroom lighting operational. Non-slip footwear when patient is off stretcher. Physically safe environment: no spills, clutter or unnecessary equipment. Stretcher in lowest position, wheels locked, appropriate side rails in place. Provide visual cue, wrist band, yellow gown, etc. Monitor gait and stability. Monitor for mental status changes and reorient to person, place, and time. Review medications for side effects contributing to fall risk. Reinforce activity limits and safety measures with patient and family.

## 2022-04-10 NOTE — ED PROVIDER NOTE - OBJECTIVE STATEMENT
62y F pmh Pancreatic CA presents for eval of weakness. Pt was recently dc from [facility] x5 days ago s/p pancreatic stent placement, since has generalized weakness and lightheadedness, aggravated with ambulation, no relieving factors. Associated decreased appetite and abdominal distention. Denies fever, ha, cp, sob, numbness, dysuria, hematuria, n/v/d/c

## 2022-04-10 NOTE — ED PROVIDER NOTE - ATTENDING CONTRIBUTION TO CARE
62 y.o. female, BIBA for weakness, decreased appetite.  Patient was discharged from [facility] 5 days ago where she was diagnosed with pancreatic cancer and had stent put in.  Patient has not been able to eat since discharge.  Patient states every time she stands up she feels dizzy and lightheaded.  No fever/chills, chest pain/shortness of breath.  Patient states her abdomen is distended.  No urinary symptoms.  Patient has an appointment with oncologist next week.  On exam, pt in NAD, AAOx3, head NC/AT, CN II-XII intact, lungs CTA B/L, CV S1S2 regular, abdomen soft/NT/distended/(+)BS, ext (-) edema, motor 5/5x4, sensation intact.  We will do labs, CT, give IV fluids and reevaluate.

## 2022-04-10 NOTE — ED PROVIDER NOTE - PHYSICAL EXAMINATION
CONST: NAD  EYES: Sclera and conjunctiva clear.   ENT: No nasal discharge. Oropharynx normal appearing  NECK: Non-tender, no meningeal signs. normal ROM. supple   CARD: S1 S2; No jvd  RESP: Equal BS B/L, No wheezes, rhonchi or rales. No distress  GI: Distended. Soft, non-tender. no cva tenderness. normal BS  MS: Normal ROM in all extremities. pulses 2 +. no calf tenderness or swelling  SKIN: Warm, dry, no acute rashes. Good turgor  NEURO: A&Ox3, No focal deficits. Strength 5/5 with no sensory deficits.

## 2022-04-10 NOTE — ED PROVIDER NOTE - NS ED ROS FT
Constitutional: (+) weakness, (-) fever  Eyes/ENT: (-) blurry vision, (-) epistaxis  Cardiovascular: (-) chest pain, (-) syncope  Respiratory: (-) cough, (-) shortness of breath  Gastrointestinal: (+) abd distention, (-) vomiting, (-) diarrhea  : (-) dysuria, (-) hematuria  Musculoskeletal: (-) neck pain, (-) back pain, (-) joint pain  Integumentary: (-) rash, (-) edema  Neurological: (-) headache, (-) altered mental status  Allergic/Immunologic: (-) pruritus

## 2022-04-10 NOTE — ED PROVIDER NOTE - CLINICAL SUMMARY MEDICAL DECISION MAKING FREE TEXT BOX
Labs noted for WBC 10.8, 20% bands, lactate 3.9.  UA negative.  Covid swab negative.  CT abdomen positive for pneumoperitoneum.  Given IV fluids, IV cefepime and Vanco.  Will admit to surgery.

## 2022-04-11 NOTE — PATIENT PROFILE ADULT - FALL HARM RISK - RISK INTERVENTIONS

## 2022-04-11 NOTE — PATIENT PROFILE ADULT - SURGICAL SITE DESCRIPTION
right abdominal area dressing intact from     recent drainage tube removal    last week at   [facility]

## 2022-04-11 NOTE — H&P ADULT - ATTENDING COMMENTS
above noted discussed case with surgical resident, pt and pt family abdomen soft mild tenderness/distension both ct scan noted source of free air not clear will continue non operative management discussed case with DR Luther from oncology

## 2022-04-11 NOTE — H&P ADULT - NSHPPHYSICALEXAM_GEN_ALL_CORE
Vital Signs Last 24 Hrs  T(C): 36.3 (10 Apr 2022 19:32), Max: 36.3 (10 Apr 2022 19:32)  T(F): 97.4 (10 Apr 2022 19:32), Max: 97.4 (10 Apr 2022 19:32)  HR: 88 (11 Apr 2022 00:15) (80 - 104)  BP: 110/58 (11 Apr 2022 01:49) (78/43 - 129/59)  BP(mean): --  RR: 18 (11 Apr 2022 00:15) (18 - 18)  SpO2: 97% (11 Apr 2022 00:15) (96% - 99%)

## 2022-04-11 NOTE — CONSULT NOTE ADULT - SUBJECTIVE AND OBJECTIVE BOX
61 yo female presented with abdominal pain and weakness.    HPI:  61 yo female was recently diagnosed with pancreatic cancer, s/p BILIARY STENT PLACEMENT - 4/5/22, S/P PANCREATIC STENT PLACEMENT 4/7/22 -ALL PROCEDURES DONE BY DR JANG  --IR  -AT [facility]. The biopsy report is not available. She presented for abdominal pain and weakness., SINCE LAST PROCEDURE has generalized weakness and lightheadedness, aggravated with ambulation, no relieving factors. Associated decreased appetite and abdominal distention. Denies fever, ha, cp, sob, numbness, dysuria, hematuria, n/v/d/c    CT a/p with contrast on admission showed Pneumoperitoneum. In the absence of recent surgical intervention, findings presumably reflecting perforation. A definitive site of perforation is not identified, however the majority of the air appears in   the upper abdomen/right upper quadrant and therefore suspect either an upper GI perforation or the possibility of biliary perforation given recent stent placement and air within the biliary tree. Pancreatic mass compatible with provided history of pancreatic cancer. Multiple hypovascular liver masses, presumably metastatic lesions.      ROS: as above       PAST MEDICAL & SURGICAL HISTORY:  Glaucoma    Pancreatic cancer        SOCIAL HISTORY:    FAMILY HISTORY:      MEDICATIONS  (STANDING):  pantoprazole  Injectable 40 milliGRAM(s) IV Push every 12 hours  piperacillin/tazobactam IVPB.. 3.375 Gram(s) IV Intermittent every 12 hours  sodium chloride 0.9%. 1000 milliLiter(s) (100 mL/Hr) IV Continuous <Continuous>    MEDICATIONS  (PRN):  HYDROmorphone  Injectable 1 milliGRAM(s) IV Push every 4 hours PRN Moderate Pain (4 - 6)  HYDROmorphone  Injectable 2 milliGRAM(s) IV Push every 6 hours PRN Severe Pain (7 - 10)  ondansetron Injectable 4 milliGRAM(s) IV Push every 6 hours PRN Nausea      Allergies    No Known Allergies    Intolerances      Vital Signs Last 24 Hrs  T(C): 35.9 (11 Apr 2022 14:38), Max: 36.4 (11 Apr 2022 05:25)  T(F): 96.7 (11 Apr 2022 14:38), Max: 97.6 (11 Apr 2022 05:25)  HR: 86 (11 Apr 2022 14:38) (80 - 104)  BP: 134/60 (11 Apr 2022 14:38) (78/43 - 134/60)  BP(mean): --  RR: 16 (11 Apr 2022 14:38) (16 - 18)  SpO2: 97% (11 Apr 2022 00:15) (96% - 99%)    PHYSICAL EXAM  General: adult in NAD  HEENT: clear oropharynx, anicteric sclera, pink conjunctiva  Neck: supple  CV: normal S1/S2 with no murmur rubs or gallops  Lungs: positive air movement b/l ant lungs,clear to auscultation, no wheezes, no rales  Abdomen: soft non-tender non-distended, no hepatosplenomegaly  Ext: no clubbing cyanosis or edema  Skin: no rashes and no petechiae  Neuro: alert and oriented X 4, no focal deficits      LABS:                          11.4   11.36 )-----------( 75       ( 11 Apr 2022 15:41 )             34.7         Mean Cell Volume : 92.0 fL  Mean Cell Hemoglobin : 30.2 pg  Mean Cell Hemoglobin Concentration : 32.9 g/dL  Auto Neutrophil # : x  Auto Lymphocyte # : x  Auto Monocyte # : x  Auto Eosinophil # : x  Auto Basophil # : x  Auto Neutrophil % : x  Auto Lymphocyte % : x  Auto Monocyte % : x  Auto Eosinophil % : x  Auto Basophil % : x      Serial CBC's  04-11 @ 15:41  Hct-34.7 / Hgb-11.4 / Plat-75 / RBC-3.77 / WBC-11.36  Serial CBC's  04-10 @ 19:45  Hct-39.6 / Hgb-12.6 / Plat-124 / RBC-4.19 / WBC-10.86      04-11    139  |  107  |  82<HH>  ----------------------------<  125<H>  3.2<L>   |  20  |  1.0    Ca    8.0<L>      11 Apr 2022 15:41  Phos  2.7     04-11  Mg     2.2     04-11    TPro  4.4<L>  /  Alb  2.4<L>  /  TBili  1.3<H>  /  DBili  x   /  AST  26  /  ALT  60<H>  /  AlkPhos  116<H>  04-11      PT/INR - ( 11 Apr 2022 15:45 )   PT: 21.50 sec;   INR: 1.88 ratio         PTT - ( 11 Apr 2022 15:45 )  PTT:27.7 sec                RADIOLOGY & ADDITIONAL STUDIES:

## 2022-04-11 NOTE — H&P ADULT - NSHPLABSRESULTS_GEN_ALL_CORE
12.6   10.86 )-----------( 124      ( 10 Apr 2022 19:45 )             39.6   04-10    137  |  99  |  90<HH>  ----------------------------<  180<H>  3.7   |  20  |  3.0<H>    Ca    7.9<L>      10 Apr 2022 19:45    TPro  4.7<L>  /  Alb  2.5<L>  /  TBili  1.9<H>  /  DBili  x   /  AST  23  /  ALT  77<H>  /  AlkPhos  134<H>  04-10    ACC: [id number] EXAM:  CT ABDOMEN AND PELVIS IC                          PROCEDURE DATE:  04/10/2022      IMPRESSION:    Pneumoperitoneum. In the absence of recent surgical intervention,   findings presumably reflecting perforation. A definitive site of   perforation is not identified, however the majority of the air appears in   the upper abdomen/right upper quadrant and therefore suspect either an   upper GI perforation or the possibility of biliary perforation given   recent stent placement and air within the biliary tree.    Free fluid predominantly along the right greater than left   abdomen/paracolic gutters. With partial surrounding enhancement on the   right. Findings may reflect partially loculated fluid/developing abscess   or the possibility of peritoneal enhancement/peritonitis. Free fluid may   reflect bile leak if biliary perforation.    Pancreatic mass compatible with provided history of pancreatic cancer.   Multiple hypovascular liver masses, presumably metastatic lesions.    Descending colonic wall thickening, may be reactive or reflect colitis.    Fluid-filled and distended stomach and proximal duodenum may reflect   associated ileus/partial obstruction with fluid extending into the distal   esophagus which may place patient at increased risk for aspiration.      Dr. Dick Linares discussed preliminary findings with provider ODETTE ADAMS MD and WALT MCKINNEY on 4/11/2022 between 105 and1:15 AM with   readback.    --- End of Report ---          DICK LINARES MD; Resident Radiologist  This document has been electronically signed.  OPAL HILLMAN MD; Attending Radiologist  This document has been electronically signed. Apr 11 2022  1:50AM

## 2022-04-11 NOTE — CHART NOTE - NSCHARTNOTEFT_GEN_A_CORE
04-11    139  |  107  |  82<HH>  ----------------------------<  125<H>  3.2<L>   |  20  |  1.0    Ca    8.0<L>      11 Apr 2022 15:41  Phos  2.7     04-11  Mg     2.2     04-11    TPro  4.4<L>  /  Alb  2.4<L>  /  TBili  1.3<H>  /  DBili  x   /  AST  26  /  ALT  60<H>  /  AlkPhos  116<H>  04-11                            11.4   11.36 )-----------( 75       ( 11 Apr 2022 15:41 )             34.7       < from: CT Abdomen and Pelvis w/ Oral Cont (04.11.22 @ 05:26) >    IMPRESSION:    When compared to examination performed 6-7 hours prior:    No definitive extraluminal contrast extravasation was identified to   localize/confirm a site of bowel perforation.  Additional interval changes as above.    < end of copied text >          D/W DR. LEUNG AND RESIDENT     WILL D/W PT AND FAMILY   AWAIT ONC AND PALLIATIVE   REPLACE POTASSIUM   LABS IN AM       POCT Blood Glucose.: 199 mg/dL (10 Apr 2022 19:10)    CARDIAC MARKERS ( 10 Apr 2022 19:45 )  x     / 0.02 ng/mL / x     / x     / x

## 2022-04-11 NOTE — CONSULT NOTE ADULT - PROBLEM SELECTOR RECOMMENDATION 9
As the pt is minimally tender at present, afebrile, and with improving physical exam and laboratory markers, I recommend nonoperative management at present. This is particularly true in lieu of her extremely high surgical risk given the extent of the disease process (non-resectable as based on CT) and the patient's malnutrition. Any conceivable operation would be palliative and likely to be associated with postoperative healing complications.    Recommend bowel rest for now; serial abdominal exams; IV PPI and broad spec abx. Palliative care consultation.    Would suggest early palliative care team consultation and, assuming the pt does well with the above plan, consider, once better optimized, Oncology (palliative chemotherapy) as well as Advanced GI consultation for palliative duodenal stent placement, as there is GOO on admission CT scan. She may be a candidate for this, as on the latter CT from admission there is no active extravisation of contrast and so the perforation may have been small and already be in the process of self-sealing.    Recommendations d/w Dr. Hay. As the pt is minimally tender at present, afebrile, and with improving physical exam and laboratory markers, I recommend a trial of nonoperative management. This is particularly important in lieu of her extremely high surgical risk given the extent of the disease process (non-resectable as based on CT) and the patient's malnutrition. Any conceivable operation in this setting would be palliative and likely to be fraught with postoperative wound healing complications or other morbidity. Further, even in the best of circumstances (i.e. no perforation being present) - the patient's overall life expectancy is on the order of months given the extent of disease noted on CT.    I recommend bowel rest for now; serial abdominal exams; IV PPI and broad spec abx. Would suggest early palliative care team consultation. Assuming the pt does well with the above plan, consider, once better optimized, Oncology  as well as Advanced GI consultations for palliative chemotherapy and duodenal stent placement, respectively, as there is GOO on admission CT scan. The pt may be a candidate for this second endoscopic intervention, as on the latter CT there is no active extravisation of contrast and so the perforation may have been small and already be in the process of self-sealing. Would defer this decision to GI service.    Recommendations d/w Dr. Hay. As the pt is minimally tender at present, afebrile, and with improving physical exam and laboratory markers, I recommend a trial of nonoperative management. This is particularly salient in lieu of her extremely high surgical risk given the extent of the disease (non-resectable as based on CT) and the patient's malnutrition. Any conceivable operation in this setting would be purely palliative and likely to be fraught with postoperative wound healing complications and/or other morbidity. Further, even in the best of circumstances (i.e. no perforation being present), the patient's life expectancy is unfortunately on the order of months given the extent of disease noted on CT.    I recommend bowel rest for now; serial abdominal exams; IV PPI and broad spec abx. Would suggest early palliative care team consultation. Assuming the pt does well with the above plan, consider, once better optimized, Oncology  as well as Advanced GI consultations for palliative chemotherapy and duodenal stent placement (prior to trial of diet), respectively, as there is GOO on admission CT scan. The pt may be a candidate for this endoscopic intervention, as on the latter CT there is no active extravisation of contrast and so the perforation may have been small and already be in the process of self-sealing as mentioned above. Would defer this decision to GI service.    Recommendations d/w Dr. Hay and with the pt herself. As the pt is minimally tender at present, afebrile, and with improving physical exam and laboratory markers, I recommend a trial of nonoperative management. This is particularly salient in lieu of her extremely high surgical risk given the extent of the disease (non-resectable as based on CT) and the patient's malnutrition. Any conceivable operation in this setting would be purely palliative and likely to be fraught with postoperative wound healing complications and/or other morbidity. Further, even in the best of circumstances (i.e. no perforation being present), the patient's life expectancy is unfortunately on the order of months given the extent of disease noted on CT.    I recommend bowel rest for now; serial abdominal exams; IV PPI and broad spec abx. Would suggest early palliative care team consultation. Assuming the pt does well with the above plan, consider, once better optimized, Oncology  as well as Advanced GI consultations for palliative chemotherapy and duodenal stent placement (prior to trial of diet), respectively, as there is GOO on admission CT scan. The pt may be a candidate for this endoscopic intervention, as on the latter CT there is no active extravisation of contrast and so the perforation may have been small and already be in the process of self-sealing as mentioned above. Would defer this decision to GI service.    Finally, can consider formal surgical oncology consultation, though the pt is almost certainly not a candidate for curative resection.    Recommendations d/w Dr. Hay and with the pt herself. As the pt is minimally tender at present, afebrile, and with improving physical exam and laboratory markers, I recommend a trial of nonoperative management. This is particularly salient in lieu of her extremely high surgical risk given the extent of the disease (non-resectable as based on CT) and the patient's malnutrition. Any conceivable operation in this setting would be purely palliative and likely to be fraught with postoperative wound healing complications and/or other morbidity. Further, even in the best of circumstances (i.e. no perforation being present), the patient's life expectancy is unfortunately on the order of months given the extent of disease noted on CT.    I recommend bowel rest for now; serial abdominal exams; IV PPI and broad spec abx. Would suggest early palliative care team consultation. Assuming the pt does well with the above plan, consider, once better optimized, Advanced GI consultations for duodenal stent placement (prior to trial of diet), respectively, as there is GOO on admission CT scan. The pt may be a candidate for this endoscopic intervention, as on the latter CT there is no active extravisation of contrast and so the perforation may have been small and already be in the process of self-sealing as mentioned above. Would defer this decision to GI service. Would involve oncology for possible palliative chemotherapy if and when patient recovers from current problem (perf), though at present she appears way too frail to tolerate this in my opinion.    Finally, can consider formal surgical oncology consultation, though the pt is almost certainly not a candidate for curative resection.    Recommendations d/w Dr. Hay and with the pt herself.

## 2022-04-11 NOTE — CONSULT NOTE ADULT - ASSESSMENT
62yFemale pmh  glaucoma recently diagnosed with Pancreatic cancer at [facility] s/p PD stent presents for abdominal pain and weakness.    Problem 1-Possible contained perforation based off repeat CT scan  Pneumoperitoneum. In the absence of recent surgical intervention,   findings presumably reflecting perforation. A definitive site of   perforation is not identified, however the majority of the air appears in   the upper abdomen/right upper quadrant and therefore suspect either an   upper GI perforation or the possibility of biliary perforation given   recent stent placement and air within the biliary tree.  Free fluid predominantly along the right greater than left   abdomen/paracolic gutters. With partial surrounding enhancement on the   right. Findings may reflect partially loculated fluid/developing abscess   or the possibility of peritoneal enhancement/peritonitis. Free fluid may   reflect bile leak if biliary perforation.  Rec  -conservative management  -surgical follow-up  -oncology consult    Pancreatic mass compatible with provided history of pancreatic cancer.   Multiple hypovascular liver masses, presumably metastatic lesions.  Rec  -oncology consult    Descending colonic wall thickening, may be reactive or reflect colitis. (asymptomatic)  Rec  -watchful waiting    Problem 4-Fluid-filled and distended stomach and proximal duodenum may reflect   associated ileus/partial obstruction with fluid extending into the distal   esophagus which may place patient at increased risk for aspiration  Rec  -patient passing flatus having bowel movements     62yFemale pmh  glaucoma recently diagnosed with Pancreatic cancer at [facility] s/p PD stent presents for abdominal pain and weakness.    Problem 1-Possible contained perforation based off repeat CT scan  Pneumoperitoneum. In the absence of recent surgical intervention,   findings presumably reflecting perforation. A definitive site of   perforation is not identified, however the majority of the air appears in   the upper abdomen/right upper quadrant and therefore suspect either an   upper GI perforation or the possibility of biliary perforation given   recent stent placement and air within the biliary tree.  Free fluid predominantly along the right greater than left   abdomen/paracolic gutters. With partial surrounding enhancement on the   right. Findings may reflect partially loculated fluid/developing abscess   or the possibility of peritoneal enhancement/peritonitis. Free fluid may   reflect bile leak if biliary perforation.  Rec  -conservative management  -surgical follow-up  -oncology consult    Problem 2-Pancreatic mass compatible with provided history of pancreatic cancer.   Multiple hypovascular liver masses, presumably metastatic lesions.  Rec  -oncology consult    Problem 3-Descending colonic wall thickening, may be reactive or reflect colitis. (asymptomatic)  Rec  -watchful waiting    Problem 4-Fluid-filled and distended stomach and proximal duodenum may reflect   associated ileus/partial obstruction with fluid extending into the distal   esophagus which may place patient at increased risk for aspiration  Rec  -patient passing flatus having bowel movements     62yFemale pmh  glaucoma recently diagnosed with Pancreatic cancer at [facility] s/p PD stent presents for abdominal pain and weakness.    Problem 1-Possible contained perforation based off repeat CT scan  Pneumoperitoneum. In the absence of recent surgical intervention,   findings presumably reflecting perforation. A definitive site of   perforation is not identified, however the majority of the air appears in   the upper abdomen/right upper quadrant and therefore suspect either an   upper GI perforation or the possibility of biliary perforation given   recent stent placement and air within the biliary tree.  Free fluid predominantly along the right greater than left   abdomen/paracolic gutters. With partial surrounding enhancement on the   right. Findings may reflect partially loculated fluid/developing abscess   or the possibility of peritoneal enhancement/peritonitis. Free fluid may   reflect bile leak if biliary perforation.  Rec  -conservative management  -surgical follow-up  -oncology consult  -continue abx    Problem 2-Pancreatic mass compatible with provided history of pancreatic cancer.   Multiple hypovascular liver masses, presumably metastatic lesions.  Rec  -oncology consult    Problem 3-Descending colonic wall thickening, may be reactive or reflect colitis. (asymptomatic)  Rec  -watchful waiting    Problem 4-Fluid-filled and distended stomach and proximal duodenum may reflect   associated ileus/partial obstruction with fluid extending into the distal   esophagus which may place patient at increased risk for aspiration  Rec  -patient passing flatus having bowel movements     62yFemale pmh  glaucoma recently diagnosed with Pancreatic cancer at [facility] s/p PD stent presents for abdominal pain and weakness.    Problem 1-Possible contained perforation based off repeat CT scan  Pneumoperitoneum. In the absence of recent surgical intervention,   findings presumably reflecting perforation. A definitive site of   perforation is not identified, however the majority of the air appears in   the upper abdomen/right upper quadrant and therefore suspect either an   upper GI perforation or the possibility of biliary perforation given   recent stent placement and air within the biliary tree.  Free fluid predominantly along the right greater than left   abdomen/paracolic gutters. With partial surrounding enhancement on the   right. Findings may reflect partially loculated fluid/developing abscess   or the possibility of peritoneal enhancement/peritonitis. Free fluid may   reflect bile leak if biliary perforation.  Rec  -conservative management, NPO, IV antibiotics  -Obtain abdominal film in am  -surgical follow-up  -oncology consult  -continue abx    Problem 2-Pancreatic mass compatible with provided history of pancreatic cancer.   Multiple hypovascular liver masses, presumably metastatic lesions.  Rec  -oncology consult    Problem 3-Descending colonic wall thickening, may be reactive or reflect colitis. (asymptomatic)  Rec  -watchful waiting    Problem 4-Fluid-filled and distended stomach and proximal duodenum may reflect   associated ileus/partial obstruction with fluid extending into the distal   esophagus which may place patient at increased risk for aspiration  Rec  -patient passing flatus having bowel movements

## 2022-04-11 NOTE — H&P ADULT - ASSESSMENT
BOWEL PERFORATION  NPO  IVF  NS  @ 89  IV  ABX VANCO/ CEFEPIME  PAIN MGMT-- DILAUDID  ZOFRAN PRN  DVT/ GI PROPHYLAXIS BOWEL PERFORATION  NPO  IVF  NS  @ 125  IV  ABX VANCO/ CEFEPIME  PAIN MGMT-- DILAUDID  ZOFRAN PRN    PANCREATIC  CANCER WITH  METS  GI CONSULT  ONCOLOGY CONSULT  DVT/ GI PROPHYLAXIS

## 2022-04-11 NOTE — CONSULT NOTE ADULT - SUBJECTIVE AND OBJECTIVE BOX
Chief complaint/Reason for consult: pancreatic cancer    HPI:  · Chief Complaint: The patient is a 62y Female complaining of poor appetite.  · HPI Objective Statement: 62y F pmh- glaucoma,  Pancreatic CANCER- DIAGNOSED  4/4/22, S/P BILIARY STENT PLACEMENT - 4/5/22, S/P PANCREATIC STENT PLACEMENT 4/7/22 -ALL PROCEDURES DONE BY DR JANG  --IR  -AT [facility] presents for eval of weakness., SINCE LAST PROCEDURE has generalized weakness and lightheadedness, aggravated with ambulation, no relieving factors. Associated decreased appetite and abdominal distention. Denies fever, ha, cp, sob, numbness, dysuria, hematuria, n/v/d/c   (11 Apr 2022 05:02)    GI Updates: 62yFemale pmh  glaucoma recently diagnosed with Pancreatic cancer at [facility] s/p PD stent presents for abdominal pain and weakness. Currently Patient denies nausea, vomiting, hematemesis, melena, blood in stool, diarrhea, constipation, abdominal pain.      PAST MEDICAL & SURGICAL HISTORY:   Glaucoma    Pancreatic cancer          Family history:  FAMILY HISTORY:    No GI cancers in first or second degree relatives    Social History: No smoking. No alcohol. No illegal drug use.    Allergies:   No Known Allergies      MEDICATIONS  (STANDING):  pantoprazole  Injectable 40 milliGRAM(s) IV Push every 12 hours  piperacillin/tazobactam IVPB.. 3.375 Gram(s) IV Intermittent every 12 hours  sodium chloride 0.9%. 1000 milliLiter(s) (100 mL/Hr) IV Continuous <Continuous>    MEDICATIONS  (PRN):  HYDROmorphone  Injectable 1 milliGRAM(s) IV Push every 4 hours PRN Moderate Pain (4 - 6)  HYDROmorphone  Injectable 2 milliGRAM(s) IV Push every 6 hours PRN Severe Pain (7 - 10)  ondansetron Injectable 4 milliGRAM(s) IV Push every 6 hours PRN Nausea        REVIEW OF SYSTEMS  General:  No weight loss, fevers, or chills.  Eyes:  No reported pain or visual changes  ENT:  No sore throat or runny nose.  NECK: No stiffness or lymphadenopathy  CV:  No chest pain or palpitations.  Resp:  No shortness of breath, cough, wheezing or hemoptysis  GI:  No abdominal pain, nausea, vomiting, dysphagia, diarrhea or constipation. No rectal bleeding, melena, or hematemesis.  Muscle:  No aches or weakness  Neuro:  No tingling, numbness       VITALS:   T(F): 97.6 (04-11-22 @ 05:25), Max: 97.6 (04-11-22 @ 05:25)  HR: 93 (04-11-22 @ 05:25) (80 - 104)  BP: 96/51 (04-11-22 @ 05:25) (78/43 - 129/59)  RR: 18 (04-11-22 @ 00:15) (18 - 18)  SpO2: 97% (04-11-22 @ 00:15) (96% - 99%)    PHYSICAL EXAM:  GENERAL: AAOx3, no acute distress.  HEAD:  Atraumatic, Normocephalic  EYES: conjunctiva and sclera clear  NECK: Supple, No thyromegaly   CHEST/LUNG: Clear to auscultation bilaterally; No wheeze, rhonchi, or rales  HEART: Regular rate and rhythm; normal S1, S2, No murmurs.  ABDOMEN: Soft, nontender, nondistended; Bowel sounds present  NEUROLOGY: No asterixis or tremor  SKIN: Intact, no jaundice          LABS:  04-10    137  |  99  |  90<HH>  ----------------------------<  180<H>  3.7   |  20  |  3.0<H>    Ca    7.9<L>      10 Apr 2022 19:45    TPro  4.7<L>  /  Alb  2.5<L>  /  TBili  1.9<H>  /  DBili  x   /  AST  23  /  ALT  77<H>  /  AlkPhos  134<H>  04-10                          12.6   10.86 )-----------( 124      ( 10 Apr 2022 19:45 )             39.6     LIVER FUNCTIONS - ( 10 Apr 2022 19:45 )  Alb: 2.5 g/dL / Pro: 4.7 g/dL / ALK PHOS: 134 U/L / ALT: 77 U/L / AST: 23 U/L / GGT: x           PT/INR - ( 10 Apr 2022 19:45 )   PT: 22.80 sec;   INR: 2.00 ratio         PTT - ( 10 Apr 2022 19:45 )  PTT:29.3 sec    IMAGING:  < from: CT Abdomen and Pelvis w/ Oral Cont (04.11.22 @ 05:26) >    ACC: [id number] EXAM:  CT ABDOMEN AND PELVIS OC                          PROCEDURE DATE:  04/11/2022          INTERPRETATION:  CLINICAL STATEMENT: Perforation. Pneumoperitoneum    TECHNIQUE: Contiguous CT images were obtained of the abdomen and pelvis.  Intravenous Contrast: None.  Oral contrast: was administered.      COMPARISON:  CT abdomen pelvis dated 4/10/2022 at 11:00 PM    FINDINGS:    The distal esophagus and stomach remained distended with focal narrowing   and thickening of the gastric antrum, unclear if direct involvement from   adjacent pancreatic mass.    Although difficult to directly compare given performed at different   times, there appears to be slight increase in density of the free   abdominal fluid when compared to prior study performed on the same CT   scanner.    Definite extraluminal oral contrast extravasation is not identified.    There is new hyperdensity within the gallbladder, possibly vicarious   excretion of contrast, felt less likely reflux through the biliary stent.    There are dilated small bowel loops to 3.7 cm without definitive   transition point identified, suspect ileus.    Persistent renal nephrograms compatible with nephropathy.    Redemonstration of pneumoperitoneum. Additional findings do not appear   significant changed.    IMPRESSION:    When compared to examination performed 6-7 hours prior:    No definitive extraluminal contrast extravasation was identified to   localize/confirm a site of bowel perforation.  Additional interval changes as above.    --- End of Report ---            OPAL HILLMAN MD; Attending Radiologist  This document has been electronically signed. Apr 11 2022  8:31AM    < end of copied text >      < from: CT Abdomen and Pelvis w/ IV Cont (04.10.22 @ 23:00) >    ACC: [id number] EXAM:  CT ABDOMEN AND PELVIS IC                          PROCEDURE DATE:  04/10/2022          INTERPRETATION:  CLINICAL HISTORY / REASON FOR EXAM: Abdominal pain.   Pancreatic cancer, recently discharged from [facility] for stent placement.    TECHNIQUE: Contiguous axial CT images were obtained from the lower chest   to the pubic symphysis following administration of 95 mL Omnipaque 350   intravenous contrast, 5 mL discarded . Oral contrast was not   administered. Coronal and sagittal reformats were submitted for review.   MIP reconstructions were also submitted for review.    COMPARISON CT: No studies are available for direct comparison.    FINDINGS:    LOWER CHEST: Trace right pleural effusion and dependent right basilar   atelectasis. Fluid-filled distal esophagus..    HEPATOBILIARY: There are multiple hypovascular liver masses measuring up   to at least 2.7 cm. A CBD stent is present. There is associated   pneumobilia. There is air in the gallbladder fundus, presumably   reflecting patency of the cystic duct as well as air noted within the   cystic duct. Periportal edema is noted.    SPLEEN: Unremarkable.    PANCREAS: There is a pancreatic mass, difficult to measure given phase of   imaging, but appears to measure 3.5 x 3.0 x 4.0 cm centered in the   pancreatic neck/proximal body with upstream pancreatic ductal dilatation   and atrophy. Superiorly this appears to encase the common hepatic and   proximal splenic arteries. The portal confluence, splenic vein and   proximal SMV are thrombosed with associated collaterals. 1.3 cm cyst at   the pancreatic body/tail junction. Additional dilated pancreatic ducts   and the pancreatic head.    ADRENAL GLANDS: Bilateral adrenal gland nodules measuring approximately   1.8 cmon the left and 2.1 m on the right..    KIDNEYS: Symmetric renal enhancement. No hydronephrosis..    ABDOMINOPELVIC NODES: Prominent gastrohepatic and periportal lymph nodes   measuring up to 1.1 m short axis.    PELVIC ORGANS: Unremarkable.    PERITONEUM/MESENTERY/BOWEL: There is dense contrast within the colon,   presumably from prior study. Dense contrast layering in the cecum   resultant streak artifact degrading evaluation in the pelvis. No bowel   obstruction is identified. The appendix is unremarkable. There is wall   thickening of the descending colon. Circumferential wall thickening noted   at the gastric antrum.    There is pneumoperitoneum with majority of the air appearing in the upper   abdomen/right upper quadrant and a few locules in the anterior mid to   lower abdomen. There is free fluid, predominantly perihepatic with   enhancement of the adjacent fascia and paracolic gutter versus rim   enhancement. Trace additional fluid along the left paracolic   gutter/perisplenic region. Fluid measures slightly higher attenuation   than simple fluid.    BONES/SOFT TISSUES: No acute osseous abnormality.    VASCULAR: The abdominal aorta is of normal caliber..    IMPRESSION:    Pneumoperitoneum. In the absence of recent surgical intervention,   findings presumably reflecting perforation. A definitive site of   perforation is not identified, however the majority of the air appears in   the upper abdomen/right upper quadrant and therefore suspect either an   upper GI perforation or the possibility of biliary perforation given   recent stent placement and air within the biliary tree.    Free fluid predominantly along the right greater than left   abdomen/paracolic gutters. With partial surrounding enhancement on the   right. Findings may reflect partially loculated fluid/developing abscess   or the possibility of peritoneal enhancement/peritonitis. Free fluid may   reflect bile leak if biliary perforation.    Pancreatic mass compatible with provided history of pancreatic cancer.   Multiple hypovascular liver masses, presumably metastatic lesions.    Descending colonic wall thickening, may be reactive or reflect colitis.    Fluid-filled and distended stomach and proximal duodenum may reflect   associated ileus/partial obstruction with fluid extending into the distal   esophagus which may place patient at increased risk for aspiration.      Dr. Dick Linares discussed preliminary findings with provider ODETTE ADAMS MD and WALT MCKINNEY on 4/11/2022 between 105 and1:15 AM with   readback.    --- End of Report ---          DICK LINARES MD; Resident Radiologist  This document has been electronically signed.  OPAL HILLMAN MD; Attending Radiologist  This document has been electronically signed. Apr 11 2022  1:50AM    < end of copied text >         Chief complaint/Reason for consult: pancreatic cancer    HPI:  · Chief Complaint: The patient is a 62y Female complaining of poor appetite.  · HPI Objective Statement: 62y F pmh- glaucoma,  Pancreatic CANCER- DIAGNOSED  4/4/22, S/P BILIARY STENT PLACEMENT - 4/5/22, S/P PANCREATIC STENT PLACEMENT 4/7/22 -ALL PROCEDURES DONE BY DR JANG  --IR  -AT [facility] presents for eval of weakness., SINCE LAST PROCEDURE has generalized weakness and lightheadedness, aggravated with ambulation, no relieving factors. Associated decreased appetite and abdominal distention. Denies fever, ha, cp, sob, numbness, dysuria, hematuria, n/v/d/c   (11 Apr 2022 05:02)    GI Updates: 62yFemale pmh  glaucoma recently diagnosed with Pancreatic cancer at [facility] s/p PD stent presents for abdominal pain and weakness. Currently Patient denies nausea, vomiting, hematemesis, melena, blood in stool, diarrhea, constipation, abdominal pain.      PAST MEDICAL & SURGICAL HISTORY:   Glaucoma    Pancreatic cancer          Family history:  FAMILY HISTORY:    No GI cancers in first or second degree relatives    Social History: No smoking. No alcohol. No illegal drug use.    Allergies:   No Known Allergies      MEDICATIONS  (STANDING):  pantoprazole  Injectable 40 milliGRAM(s) IV Push every 12 hours  piperacillin/tazobactam IVPB.. 3.375 Gram(s) IV Intermittent every 12 hours  sodium chloride 0.9%. 1000 milliLiter(s) (100 mL/Hr) IV Continuous <Continuous>    MEDICATIONS  (PRN):  HYDROmorphone  Injectable 1 milliGRAM(s) IV Push every 4 hours PRN Moderate Pain (4 - 6)  HYDROmorphone  Injectable 2 milliGRAM(s) IV Push every 6 hours PRN Severe Pain (7 - 10)  ondansetron Injectable 4 milliGRAM(s) IV Push every 6 hours PRN Nausea        REVIEW OF SYSTEMS  General:  No weight loss, fevers, or chills.  Eyes:  No reported pain or visual changes  ENT:  No sore throat or runny nose.  NECK: No stiffness or lymphadenopathy  CV:  No chest pain or palpitations.  Resp:  No shortness of breath, cough, wheezing or hemoptysis  GI:  No abdominal pain, nausea, vomiting, dysphagia, diarrhea or constipation. No rectal bleeding, melena, or hematemesis.  Muscle:  No aches or weakness  Neuro:  No tingling, numbness       VITALS:   T(F): 97.6 (04-11-22 @ 05:25), Max: 97.6 (04-11-22 @ 05:25)  HR: 93 (04-11-22 @ 05:25) (80 - 104)  BP: 96/51 (04-11-22 @ 05:25) (78/43 - 129/59)  RR: 18 (04-11-22 @ 00:15) (18 - 18)  SpO2: 97% (04-11-22 @ 00:15) (96% - 99%)    PHYSICAL EXAM:  GENERAL: AAOx3, no acute distress.  HEAD:  Atraumatic, Normocephalic  EYES: conjunctiva and sclera clear  NECK: Supple, No thyromegaly   CHEST/LUNG: Clear to auscultation bilaterally; No wheeze, rhonchi, or rales  HEART: Regular rate and rhythm; normal S1, S2, No murmurs.  ABDOMEN: Soft, nontender, +distended; Bowel sounds present  NEUROLOGY: No asterixis or tremor  SKIN: Intact, no jaundice          LABS:  04-10    137  |  99  |  90<HH>  ----------------------------<  180<H>  3.7   |  20  |  3.0<H>    Ca    7.9<L>      10 Apr 2022 19:45    TPro  4.7<L>  /  Alb  2.5<L>  /  TBili  1.9<H>  /  DBili  x   /  AST  23  /  ALT  77<H>  /  AlkPhos  134<H>  04-10                          12.6   10.86 )-----------( 124      ( 10 Apr 2022 19:45 )             39.6     LIVER FUNCTIONS - ( 10 Apr 2022 19:45 )  Alb: 2.5 g/dL / Pro: 4.7 g/dL / ALK PHOS: 134 U/L / ALT: 77 U/L / AST: 23 U/L / GGT: x           PT/INR - ( 10 Apr 2022 19:45 )   PT: 22.80 sec;   INR: 2.00 ratio         PTT - ( 10 Apr 2022 19:45 )  PTT:29.3 sec    IMAGING:  < from: CT Abdomen and Pelvis w/ Oral Cont (04.11.22 @ 05:26) >    ACC: [id number] EXAM:  CT ABDOMEN AND PELVIS OC                          PROCEDURE DATE:  04/11/2022          INTERPRETATION:  CLINICAL STATEMENT: Perforation. Pneumoperitoneum    TECHNIQUE: Contiguous CT images were obtained of the abdomen and pelvis.  Intravenous Contrast: None.  Oral contrast: was administered.      COMPARISON:  CT abdomen pelvis dated 4/10/2022 at 11:00 PM    FINDINGS:    The distal esophagus and stomach remained distended with focal narrowing   and thickening of the gastric antrum, unclear if direct involvement from   adjacent pancreatic mass.    Although difficult to directly compare given performed at different   times, there appears to be slight increase in density of the free   abdominal fluid when compared to prior study performed on the same CT   scanner.    Definite extraluminal oral contrast extravasation is not identified.    There is new hyperdensity within the gallbladder, possibly vicarious   excretion of contrast, felt less likely reflux through the biliary stent.    There are dilated small bowel loops to 3.7 cm without definitive   transition point identified, suspect ileus.    Persistent renal nephrograms compatible with nephropathy.    Redemonstration of pneumoperitoneum. Additional findings do not appear   significant changed.    IMPRESSION:    When compared to examination performed 6-7 hours prior:    No definitive extraluminal contrast extravasation was identified to   localize/confirm a site of bowel perforation.  Additional interval changes as above.    --- End of Report ---            OPAL HILLMAN MD; Attending Radiologist  This document has been electronically signed. Apr 11 2022  8:31AM    < end of copied text >      < from: CT Abdomen and Pelvis w/ IV Cont (04.10.22 @ 23:00) >    ACC: [id number] EXAM:  CT ABDOMEN AND PELVIS IC                          PROCEDURE DATE:  04/10/2022          INTERPRETATION:  CLINICAL HISTORY / REASON FOR EXAM: Abdominal pain.   Pancreatic cancer, recently discharged from [facility] for stent placement.    TECHNIQUE: Contiguous axial CT images were obtained from the lower chest   to the pubic symphysis following administration of 95 mL Omnipaque 350   intravenous contrast, 5 mL discarded . Oral contrast was not   administered. Coronal and sagittal reformats were submitted for review.   MIP reconstructions were also submitted for review.    COMPARISON CT: No studies are available for direct comparison.    FINDINGS:    LOWER CHEST: Trace right pleural effusion and dependent right basilar   atelectasis. Fluid-filled distal esophagus..    HEPATOBILIARY: There are multiple hypovascular liver masses measuring up   to at least 2.7 cm. A CBD stent is present. There is associated   pneumobilia. There is air in the gallbladder fundus, presumably   reflecting patency of the cystic duct as well as air noted within the   cystic duct. Periportal edema is noted.    SPLEEN: Unremarkable.    PANCREAS: There is a pancreatic mass, difficult to measure given phase of   imaging, but appears to measure 3.5 x 3.0 x 4.0 cm centered in the   pancreatic neck/proximal body with upstream pancreatic ductal dilatation   and atrophy. Superiorly this appears to encase the common hepatic and   proximal splenic arteries. The portal confluence, splenic vein and   proximal SMV are thrombosed with associated collaterals. 1.3 cm cyst at   the pancreatic body/tail junction. Additional dilated pancreatic ducts   and the pancreatic head.    ADRENAL GLANDS: Bilateral adrenal gland nodules measuring approximately   1.8 cmon the left and 2.1 m on the right..    KIDNEYS: Symmetric renal enhancement. No hydronephrosis..    ABDOMINOPELVIC NODES: Prominent gastrohepatic and periportal lymph nodes   measuring up to 1.1 m short axis.    PELVIC ORGANS: Unremarkable.    PERITONEUM/MESENTERY/BOWEL: There is dense contrast within the colon,   presumably from prior study. Dense contrast layering in the cecum   resultant streak artifact degrading evaluation in the pelvis. No bowel   obstruction is identified. The appendix is unremarkable. There is wall   thickening of the descending colon. Circumferential wall thickening noted   at the gastric antrum.    There is pneumoperitoneum with majority of the air appearing in the upper   abdomen/right upper quadrant and a few locules in the anterior mid to   lower abdomen. There is free fluid, predominantly perihepatic with   enhancement of the adjacent fascia and paracolic gutter versus rim   enhancement. Trace additional fluid along the left paracolic   gutter/perisplenic region. Fluid measures slightly higher attenuation   than simple fluid.    BONES/SOFT TISSUES: No acute osseous abnormality.    VASCULAR: The abdominal aorta is of normal caliber..    IMPRESSION:    Pneumoperitoneum. In the absence of recent surgical intervention,   findings presumably reflecting perforation. A definitive site of   perforation is not identified, however the majority of the air appears in   the upper abdomen/right upper quadrant and therefore suspect either an   upper GI perforation or the possibility of biliary perforation given   recent stent placement and air within the biliary tree.    Free fluid predominantly along the right greater than left   abdomen/paracolic gutters. With partial surrounding enhancement on the   right. Findings may reflect partially loculated fluid/developing abscess   or the possibility of peritoneal enhancement/peritonitis. Free fluid may   reflect bile leak if biliary perforation.    Pancreatic mass compatible with provided history of pancreatic cancer.   Multiple hypovascular liver masses, presumably metastatic lesions.    Descending colonic wall thickening, may be reactive or reflect colitis.    Fluid-filled and distended stomach and proximal duodenum may reflect   associated ileus/partial obstruction with fluid extending into the distal   esophagus which may place patient at increased risk for aspiration.      Dr. Dick Linares discussed preliminary findings with provider ODETTE ADAMS MD and WALT MCKINNEY on 4/11/2022 between 105 and1:15 AM with   readback.    --- End of Report ---          DICK LINARES MD; Resident Radiologist  This document has been electronically signed.  OPAL HILLMAN MD; Attending Radiologist  This document has been electronically signed. Apr 11 2022  1:50AM    < end of copied text >

## 2022-04-11 NOTE — CHART NOTE - NSCHARTNOTEFT_GEN_A_CORE
PALLIATIVE MEDICINE INTERDISCIPLINARY TEAM NOTE    Provider:  [  x ]Social Work   [   ]          [ x  ] Initial visit [   ] Follow up    Family or contact name / phone #   Met with: [  x ] Patient  [x   ] Family:  spouse, Flakito Monge was at bedside  [   ] Other:    Primary Language: [  x ] English [   ] Other*:                      *Interpretation provided by:    SUPPORT DIAGNOSES            (Check all that apply)  [   ] Psychosocial spiritual assessment (PSSA)  [   ] EOL issues  [   ] Cultural / spiritual concerns  [ x  ] Pain / suffering  [   ] Dementia / AMS  [   ] Other:  [   x] AD issues  [ x  ] Grief / loss / sadness  [   ] Discharge issues  [ x  ] Distress / coping    PSYCHOSOCIAL ASSESSMENT OF PATIENT         (Check all that apply)  [x   ] Initial Assessment            [   ] Reassessment          [   ] Not Applicable this visit    Pain/suffering acuity:  [ x  ] None to mild (0-3)           [   ] Moderate (4-6)        [   ] High (7-10)    Mental Status:  [  x ] Alert/oriented (x3)          [   ] Confused/Altered(x2/x1)         [   ] Non-resp    Functional status:  [   ] Independent w ADLs      [  x ] Needs Assistance             [   ] Bedbound/Full Care    Coping:  [   ] Coping well                     [x   ] Coping w/difficulty            [   ] Poor coping    Support system:  [x   ] Strong                              [   ] Adequate                        [   ] Inadequate      Past history and medications for:     [ x] Anxiety       [ ] Depression    [ ] Sleep disorders     SPIRITUAL ASSESSMENT  Orthodox/Spiritual practice: ___________________________    Role of organized Hindu:  [   ] Important                     [   ] Some (fam tradition, cultural)               [   ] None    Effects on medical care:  [   ] Yes, _____________________________________                         [   ] None    Cultural/Mandaen need:  [   ] Yes, _____________________________________                         [   ] None    Refer to Pastoral Care:  [   ] Yes           [   ] No, not at this time    SERVICE PROVIDED  [   ]PSSA                                                                             [   ]Discharge support / facilitation  [ x  ]AD / goals of care counseling                                  [   ]EOL / death / bereavement counseling  [ x  ]Counseling / support                                                [   ] Family meeting  [   ]Prayer / sacrament / ritual                                      [   ] Referral   [   ]Other                                                                       NOTE and Plan of Care (PoC):    Patient is a 62 year old, , female.  Patient was admitted on 4/11/21, dx:  perforated bowel, bandemia, elevated troponin, Pneumoperitoneum.  Patient was discharged from [facility] five days ago.  During that hospitalization she was diagnosed with Pancreatic Cancer, s/p stent inserted.    Patient was pleasant when approached and easily engaged.  Spouse, Flakito Monge, was at bedside.  Provided information regarding role of Palliative Care and AD.  Patient/family will consider same.  Support rendered.

## 2022-04-11 NOTE — CONSULT NOTE ADULT - SUBJECTIVE AND OBJECTIVE BOX
The pt is a 63yo woman with a history of glaucoma and a recent diagnosis of metastatic pancreatic adenocarcinoma. The pt was in her usual state of health until approximately few weeks ago. She began to feel malaise, weakness, early satiety and upper back pain. Her symptoms progressed, and she began to notice pruritis as well as jaundice. There was an associated amount of weight loss, but pt unable to quantify how much. She ultimately was seen at [facility] where workup revealed hyperbilirubinemia as well as a large pancreatic mass with resulting PD dilation as well as intra and extrahepatic biliary dilation. The patient underwent EUS/ERCP with biliary stent placement for decompression of the biliary tree. She cites no complications following this procedure of concern. She presented to our hospital yest in the setting of abdominal pain. Workup revealed afebrile status as well as hemodynamic stability and abdominal tenderness. Labs show anemia, with thrombocytopenia (75), essentially normal LFTs serum lipase 91. WBC ct normal, but +bandemia on admission. This improved with hydration, abx and on subsequent CBC. Tumor markers (, CEA) not sent.     A CT with IV contrast (non-pancreatic protocol) was done and shows dilated stomach with biliary stent in adequate position. There is a large pancreatic mass present and innumerable hepatic metastases in both lobes. +periportal edema. There is pneumobilia as expected post-ERCP as well as a scant amount of pneumoperitoneum above the liver. The pt was admitted, started on IV abx. Of note, none of her prior records (workup at [facility] including most recent endoscopy findings/reports and pathology reports) are available to me at this time. A second CT with enteral contrast was performed; no extravisation was noted though most of the contrast remains intragastric at the time of study, suggesting an element of gastric outlet obstruction from the primary pancreatic malignancy. GI consulted on the pt and input is forthcoming.    At present, pt is in her room and appears comfortable. She denies nausea or vomiting this afternoon. The pt is holding her abdomen but states her pain level to be equivalent to what it had been early this morning. I am consulted for second opinion by Dr. Hay to help discern whether the pt requires exploration.    PMH  Glaucoma    PSH  No past surgical history    ALLERGIES: NKDA    PSH: No alcohol use; no smoking history on file    FAMILY HISTORY: no familial history of malignancy in first degree relatives    ROS otherwise negative, apart from what is mentioned above    PHYSICAL EXAM:    Pt has been afebrile all day. She is normotensive at present. She is cachectic in appearance. The skin is nonjaundiced and is warm. Heart exam with rrr and no m/g/r; breath sounds shallow but equal bilaterally. The abdomen is distended with very minimal/mild tenderness in the R and epigastric areas. There is no evidence of peritonitis. The extremities appear warm and are well-perfused. Pt is currently with catheter, productive of yellow urine. She is calm and with appropriate mood and affect, and very pleasant.    Labs as above   The pt is a 61yo woman with a history of glaucoma and a recent diagnosis of metastatic pancreatic adenocarcinoma. The pt was in her usual state of health until approximately few weeks ago. She began to feel malaise, weakness, early satiety and upper back pain. Her symptoms progressed, and she began to notice pruritis as well as jaundice. There was an associated amount of weight loss, but pt unable to quantify how much. She ultimately was seen at [facility] where workup revealed hyperbilirubinemia as well as a large pancreatic mass with resulting PD dilation as well as intra and extrahepatic biliary dilation. The patient underwent EUS/ERCP with biliary stent placement for decompression of the biliary tree. She cites no complications following this procedure of concern. She presented to our hospital yest in the setting of abdominal pain. Workup revealed afebrile status as well as hemodynamic stability and abdominal tenderness. Labs show anemia, with thrombocytopenia (75), essentially normal LFTs serum lipase 91. WBC ct normal, but +bandemia on admission. This improved with hydration, abx and on subsequent CBC. Tumor markers (, CEA) not sent.     A CT with IV contrast (non-pancreatic protocol) was done and shows dilated stomach with biliary stent in adequate position. There is a large pancreatic mass present and innumerable hepatic metastases in both lobes. +periportal edema. There is pneumobilia as expected post-ERCP as well as a scant amount of pneumoperitoneum above the liver. The pt was admitted, started on IV abx. Of note, none of her prior records (workup at [facility] including most recent endoscopy findings/reports and pathology reports) are available to me at this time. A second CT with enteral contrast was performed; no extravisation was noted though most of the contrast remains intragastric at the time of study, suggesting an element of gastric outlet obstruction from the primary pancreatic malignancy. GI consulted on the pt and input is forthcoming.    At present, pt is in her room and appears comfortable. She denies nausea or vomiting this afternoon. The pt is holding her abdomen but states her pain level to be equivalent to what it had been early this morning. I am consulted for second opinion by Dr. Hay to help discern whether the pt requires exploration.    PMH  Glaucoma    PSH  No past surgical history    ALLERGIES: NKDA    PSH: No alcohol use; no smoking history on file    FAMILY HISTORY: no familial history of malignancy in first degree relatives    ROS otherwise negative, apart from what is mentioned above    PHYSICAL EXAM:    Pt has been afebrile all day. She is normotensive at present. She is cachectic in appearance. The skin is nonjaundiced and is warm. Heart exam with rrr and no m/g/r; breath sounds shallow but equal bilaterally. The abdomen is distended with very minimal/mild tenderness in the R and epigastric areas. There is no evidence of peritonitis. The extremities appear warm and are well-perfused. Pt is currently with catheter, productive of yellow urine. She is calm and with appropriate mood and affect, and very pleasant.    Labs, imaging as above   The pt is a 61yo woman with a history of glaucoma and a recent diagnosis of metastatic pancreatic malingancy. The pt was in her usual state of health until approximately few weeks ago. She began to feel malaise, weakness, early satiety and upper back pain. Her symptoms progressed, and she began to notice pruritis as well as jaundice. There was associated unintentional weight loss, but the pt unable to quantify how much. She ultimately was seen at [facility] where workup revealed hyperbilirubinemia as well as a large pancreatic mass with resulting PD dilation as well as intra and extrahepatic biliary dilation. The patient underwent EUS/ERCP with biliary stent placement for decompression of the biliary tree. She cites no immediate complications following this procedure. She presented to our hospital yest in the setting of abdominal pain. Her  advised her to come in as the pain was getting worse and the pt was weak and unable to take or tolerate PO. Workup revealed afebrile status as well as hemodynamic stability and abdominal tenderness. Labs showed anemia, with thrombocytopenia (75), essentially normal LFTs, serum lipase 91. WBC ct normal, but +bandemia on admission. This latter finding improved with hydration, abx on the subsequent CBC. Tumor markers (, CEA) were not sent.     A CT with IV contrast (non-pancreatic protocol) was done and showed dilated stomach with biliary stent in correct position. There is a large pancreatic mass present and innumerable hepatic metastases in both lobes. +periportal edema and ascites is present. There is pneumobilia as expected post-ERCP as well as a scant amount of pneumoperitoneum above the liver. The pt was admitted, started on IV abx. Of note, none of her prior records (workup at [facility] including most recent endoscopy findings/reports and pathology reports) are available to me at this time. A second CT with enteral contrast was performed; no extravisation was noted though most of the contrast remains intragastric at the time of study, suggesting an element of gastric outlet obstruction from the pancreatic malignancy. Of note, no worsening pneumoperitoneum was identified. GI team was consulted on the pt given her history and their input is forthcoming.    At present, pt is in her room and appears comfortable. She denies nausea or vomiting this afternoon. The pt is holding her abdomen but states her pain level to be equivalent to what it had been early this morning. I am consulted for second opinion by Dr. Hay to help discern whether the pt requires exploration at present given imaging findings and pt history and exam.    PMH  Glaucoma    PSxH  No known past surgical history    ALLERGIES: NKDA    PSH: No alcohol use; no smoking history on file    FAMILY HISTORY: no familial history of malignancy in first degree relatives    ROS otherwise negative, apart from what is mentioned above    PHYSICAL EXAM:    Pt has been afebrile all day and at present. She is currently normotensive. She is cachectic in appearance. The skin is nonjaundiced and is warm. Heart exam with rrr and no m/g/r. Breath sounds shallow but equal bilaterally; breathing is nonlabored. The abdomen is distended with very minimal/mild tenderness in the R and epigastric areas. There is no evidence of peritonitis. The extremities appear warm and are well-perfused. Pt is currently with catheter, productive of yellow urine. She is calm and with appropriate mood and affect, and very pleasant.    Labs, imaging as above

## 2022-04-11 NOTE — H&P ADULT - HISTORY OF PRESENT ILLNESS
How Severe Is Your Dry Skin?: mild · Chief Complaint: The patient is a 62y Female complaining of poor appetite.  · HPI Objective Statement: 62y F pmh- glaucoma,  Pancreatic CANCER- DIAGNOSED  4/4/22, S/P BILIARY STENT PLACEMENT - 4/5/22, S/P PANCREATIC STENT PLACEMENT 4/7/22 -ALL PROCEDURES DONE BY DR JANG  --IR  -AT [facility] presents for eval of weakness., SINCE LAST PROCEDURE has generalized weakness and lightheadedness, aggravated with ambulation, no relieving factors. Associated decreased appetite and abdominal distention. Denies fever, ha, cp, sob, numbness, dysuria, hematuria, n/v/d/c

## 2022-04-11 NOTE — CONSULT NOTE ADULT - ASSESSMENT
63 yo female has newly diagnosed pancreatic cancer, s/p stent placement at [facility]. CT a/p shows Pneumoperitoneum. In the absence of recent surgical intervention, findings presumably reflecting perforation. A definitive site of perforation is not identified, however the majority of the air appears in the upper abdomen/right upper quadrant and therefore suspect either an upper GI perforation or the possibility of biliary perforation given recent stent placement and air within the biliary tree. Pancreatic mass compatible with provided history of pancreatic cancer. Multiple hypovascular liver masses, presumably metastatic lesions.    Assessment and Plan:  -- Pancreatic mass with multiple hypovascular liver masses suggests stage IV disease. Would recommend to get complete medical records from [facility] including imaging and pathology if the patient plans to stay here for treatment. Systemic chemotherapy will be recommended but will need to review pathology report first.  -- Check CA19.9, CEA.  -- Please order CT chest if it was not done before.  -- Pneumoperitoneum. Followup with GI.  -- KOKO with elevated Bun and Cr, improving. Continue hydration. F/U with nephrology.  -- Thrombocytopenia. New onset (?). Maybe due to medication. Monitor CBC. Order Haptoglobin, LDH, B12, Folate.

## 2022-04-11 NOTE — PATIENT PROFILE ADULT - DO YOU LACK THE NECESSARY SUPPORT TO HELP YOU COPE WITH LIFE CHALLENGES?
- no

## 2022-04-11 NOTE — CONSULT NOTE ADULT - ASSESSMENT
This is a 63yo woman with a largely unremarkable known medical history, presenting with cachexia, weakness, and early satiety and a known history of metastatic pancreatic ca (presumably pancreatic adenoca), s/p biliary decompression with EUS/ERCP + stent. She did well following this procedure but presents now with abdominal pain and is found to have pneumoperitoneum (scant) with biliary stent in correct position.    Given the timing of >1 week, I believe current presentation denotes perforation of the primary tumor vs complication of endoscopy.  This is a 63yo woman with a largely unremarkable known medical history, presenting with cachexia, weakness, and early satiety and a known history of metastatic pancreatic ca (presumably pancreatic adenoca), s/p biliary decompression with EUS/ERCP + stent. She did well following this procedure but presents now with abdominal pain and is found to have pneumoperitoneum (scant) with biliary stent in correct position.    Given the timing of >1 week since her procedure, I believe current presentation denotes perforation of the primary tumor rather than a complication of endoscopy (iatrogenic perforation).  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 She did well following this procedure but presents now with abdominal pain and is found to have pneumoperitoneum (scant) with biliary stent in correct position. Given the timing of >1 week since her procedure, I believe current presentation denotes perforation of the primary tumor rather than a complication of endoscopy (iatrogenic perforation). I also believe the perforation to have likely been of relatively small size (a so-called 'microperforation') which may already be sealed or in the process of sealing.

## 2022-04-12 NOTE — DIETITIAN INITIAL EVALUATION ADULT - ORAL NUTRITION SUPPLEMENTS
RECOMMEND: Ensure Clear 3x/daily (240kcal, 8 g pro/serving) + Prosource Gelatein 3x/daily (160kcal, 20 g pro/serving) with clear diet  Switch to Ensure Max 2x/daily (150kcal, 30 g pro/serving) + Ensure Enlive 1x/daily (350kcal, 20 g pro/serving) with further advancement

## 2022-04-12 NOTE — DIETITIAN INITIAL EVALUATION ADULT - SIGNS/SYMPTOMS
meeting </=50% est energy needs for >5days, severe muscle depletion, >5% wt loss in 1month meeting <50% est energy needs during admission x1day

## 2022-04-12 NOTE — PROGRESS NOTE ADULT - SUBJECTIVE AND OBJECTIVE BOX
62yFemale  Being followed for pancreatic cancer  Interval history: Patient denies nausea, vomiting, hematemesis, melena, blood in stool, diarrhea, constipation, abdominal pain. Patient notes abdominal discomfort is much better.      PAST MEDICAL & SURGICAL HISTORY:   Glaucoma    Pancreatic cancer            Social History: No smoking. No alcohol. No illegal drug use.            MEDICATIONS  (STANDING):  latanoprost 0.005% Ophthalmic Solution 1 Drop(s) Both EYES at bedtime  pantoprazole  Injectable 40 milliGRAM(s) IV Push every 12 hours  piperacillin/tazobactam IVPB.. 3.375 Gram(s) IV Intermittent every 12 hours  sodium chloride 0.9%. 1000 milliLiter(s) (100 mL/Hr) IV Continuous <Continuous>    MEDICATIONS  (PRN):  HYDROmorphone  Injectable 1 milliGRAM(s) IV Push every 4 hours PRN Moderate Pain (4 - 6)  HYDROmorphone  Injectable 2 milliGRAM(s) IV Push every 6 hours PRN Severe Pain (7 - 10)  ondansetron Injectable 4 milliGRAM(s) IV Push every 6 hours PRN Nausea      Allergies:   No Known Allergies          REVIEW OF SYSTEMS:  General:  No fevers, or chills.  Eyes:  No reported pain or visual changes  ENT:  No sore throat or runny nose.  NECK: No stiffness   CV:  No chest pain or palpitations.  Resp:  No shortness of breath, cough  GI:  No abdominal pain, nausea, vomiting, dysphagia, diarrhea or constipation. No rectal bleeding, melena, or hematemesis.  Neuro:  No tingling, numbness         VITAL SIGNS:   T(F): 98.1 (04-12-22 @ 05:15), Max: 98.4 (04-11-22 @ 21:00)  HR: 86 (04-12-22 @ 05:15) (86 - 95)  BP: 120/64 (04-12-22 @ 05:15) (119/60 - 134/60)  RR: 18 (04-11-22 @ 21:00) (16 - 18)  SpO2: --    PHYSICAL EXAM:  GENERAL: AAOx3, no acute distress.  HEAD:  Atraumatic, Normocephalic  EYES: conjunctiva and sclera clear  NECK: Supple, no JVD or thyromegaly  CHEST/LUNG: Clear to auscultation bilaterally; No wheeze, rhonchi, or rales  HEART: Regular rate and rhythm; normal S1, S2, No murmurs.  ABDOMEN: Soft, nontender, nondistended; Bowel sounds present  NEUROLOGY: No asterixis or tremor.   SKIN: Intact, no jaundice            LABS:                        10.7   12.02 )-----------( 53       ( 12 Apr 2022 08:12 )             32.6     04-12    147<H>  |  116<H>  |  54<H>  ----------------------------<  121<H>  4.2   |  19  |  0.7    Ca    8.4<L>      12 Apr 2022 08:12  Phos  2.7     04-11  Mg     2.2     04-11    TPro  5.0<L>  /  Alb  2.7<L>  /  TBili  1.4<H>  /  DBili  x   /  AST  35  /  ALT  70<H>  /  AlkPhos  166<H>  04-12    LIVER FUNCTIONS - ( 12 Apr 2022 08:12 )  Alb: 2.7 g/dL / Pro: 5.0 g/dL / ALK PHOS: 166 U/L / ALT: 70 U/L / AST: 35 U/L / GGT: x           PT/INR - ( 11 Apr 2022 15:45 )   PT: 21.50 sec;   INR: 1.88 ratio         PTT - ( 11 Apr 2022 15:45 )  PTT:27.7 sec    IMAGING:    < from: CT Abdomen and Pelvis w/ Oral Cont (04.11.22 @ 05:26) >    ACC: [id number] EXAM:  CT ABDOMEN AND PELVIS OC                          PROCEDURE DATE:  04/11/2022          INTERPRETATION:  CLINICAL STATEMENT: Perforation. Pneumoperitoneum    TECHNIQUE: Contiguous CT images were obtained of the abdomen and pelvis.  Intravenous Contrast: None.  Oral contrast: was administered.      COMPARISON:  CT abdomen pelvis dated 4/10/2022 at 11:00 PM    FINDINGS:    The distal esophagus and stomach remained distended with focal narrowing   and thickening of the gastric antrum, unclear if direct involvement from   adjacent pancreatic mass.    Although difficult to directly compare given performed at different   times, there appears to be slight increase in density of the free   abdominal fluid when compared to prior study performed on the same CT   scanner.    Definite extraluminal oral contrast extravasation is not identified.    There is new hyperdensity within the gallbladder, possibly vicarious   excretion of contrast, felt less likely reflux through the biliary stent.    There are dilated small bowel loops to 3.7 cm without definitive   transition point identified, suspect ileus.    Persistent renal nephrograms compatible with nephropathy.    Redemonstration of pneumoperitoneum. Additional findings do not appear   significant changed.    IMPRESSION:    When compared to examination performed 6-7 hours prior:    No definitive extraluminal contrast extravasation was identified to   localize/confirm a site of bowel perforation.  Additional interval changes as above.    --- End of Report ---            OPAL HILLMAN MD; Attending Radiologist  This document has been electronically signed. Apr 11 2022  8:31AM    < end of copied text >

## 2022-04-12 NOTE — CONSULT NOTE ADULT - SUBJECTIVE AND OBJECTIVE BOX
Patient is a 62y old  Female who presents with a chief complaint of Abd pain (12 Apr 2022 10:54)     62y F pmh- glaucoma,  Pancreatic CANCER- DIAGNOSED  4/4/22, S/P BILIARY STENT PLACEMENT - 4/5/22, S/P PANCREATIC STENT PLACEMENT 4/7/22 -ALL PROCEDURES DONE BY DR JANG  --IR  -AT [facility] presents for eval of weakness., SINCE LAST PROCEDURE has generalized weakness and lightheadedness, aggravated with ambulation, no relieving factors. Associated decreased appetite and abdominal distention. Denies fever, ha, cp, sob, numbness, dysuria, hematuria, n/v/d/c   (11 Apr 2022 05:02)       ROS:  Negative except for:abdo pain    PAST MEDICAL & SURGICAL HISTORY:  Glaucoma    Pancreatic cancer        SOCIAL HISTORY:    FAMILY HISTORY:      MEDICATIONS  (STANDING):  latanoprost 0.005% Ophthalmic Solution 1 Drop(s) Both EYES at bedtime  pantoprazole  Injectable 40 milliGRAM(s) IV Push every 12 hours  piperacillin/tazobactam IVPB.. 3.375 Gram(s) IV Intermittent every 12 hours  sodium chloride 0.9%. 1000 milliLiter(s) (100 mL/Hr) IV Continuous <Continuous>    MEDICATIONS  (PRN):  HYDROmorphone  Injectable 1 milliGRAM(s) IV Push every 4 hours PRN Moderate Pain (4 - 6)  HYDROmorphone  Injectable 2 milliGRAM(s) IV Push every 6 hours PRN Severe Pain (7 - 10)  ondansetron Injectable 4 milliGRAM(s) IV Push every 6 hours PRN Nausea      Allergies    No Known Allergies    Intolerances        Vital Signs Last 24 Hrs  T(C): 36.7 (12 Apr 2022 05:15), Max: 36.9 (11 Apr 2022 21:00)  T(F): 98.1 (12 Apr 2022 05:15), Max: 98.4 (11 Apr 2022 21:00)  HR: 86 (12 Apr 2022 05:15) (86 - 95)  BP: 120/64 (12 Apr 2022 05:15) (119/60 - 134/60)  BP(mean): --  RR: 18 (11 Apr 2022 21:00) (16 - 18)  SpO2: --    PHYSICAL EXAM  General: adult in NAD  HEENT: clear oropharynx, anicteric sclera, pink conjunctiva  Neck: supple  CV: normal S1/S2 with no murmur rubs or gallops  Lungs: positive air movement b/l ant lungs,clear to auscultation, no wheezes, no rales  Abdomen: soft non-tender non-distended, no hepatosplenomegaly  Ext: no clubbing cyanosis or edema  Skin: no rashes and no petechiae  Neuro: alert and oriented X 4, no focal deficits      LABS:                          10.7   12.02 )-----------( 53       ( 12 Apr 2022 08:12 )             32.6         Mean Cell Volume : 92.6 fL  Mean Cell Hemoglobin : 30.4 pg  Mean Cell Hemoglobin Concentration : 32.8 g/dL  Auto Neutrophil # : x  Auto Lymphocyte # : x  Auto Monocyte # : x  Auto Eosinophil # : x  Auto Basophil # : x  Auto Neutrophil % : x  Auto Lymphocyte % : x  Auto Monocyte % : x  Auto Eosinophil % : x  Auto Basophil % : x      Serial CBC's  04-12 @ 08:12  Hct-32.6 / Hgb-10.7 / Plat-53 / RBC-3.52 / WBC-12.02  Serial CBC's  04-11 @ 15:41  Hct-34.7 / Hgb-11.4 / Plat-75 / RBC-3.77 / WBC-11.36  Serial CBC's  04-10 @ 19:45  Hct-39.6 / Hgb-12.6 / Plat-124 / RBC-4.19 / WBC-10.86      04-12    147<H>  |  116<H>  |  54<H>  ----------------------------<  121<H>  4.2   |  19  |  0.7    Ca    8.4<L>      12 Apr 2022 08:12  Phos  2.7     04-11  Mg     2.2     04-11    TPro  5.0<L>  /  Alb  2.7<L>  /  TBili  1.4<H>  /  DBili  x   /  AST  35  /  ALT  70<H>  /  AlkPhos  166<H>  04-12      PT/INR - ( 11 Apr 2022 15:45 )   PT: 21.50 sec;   INR: 1.88 ratio         PTT - ( 11 Apr 2022 15:45 )  PTT:27.7 sec                BLOOD SMEAR INTERPRETATION:       RADIOLOGY & ADDITIONAL STUDIES:

## 2022-04-12 NOTE — DIETITIAN NUTRITION RISK NOTIFICATION - TREATMENT: THE FOLLOWING DIET HAS BEEN RECOMMENDED
Diet, Clear Liquid:   Prosource Gelatein Plus     Qty per Day:  3x  Supplement Feeding Modality:  Oral  Ensure Clear Cans or Servings Per Day:  3       Frequency:  Daily (04-12-22 @ 19:21) [Pending Verification By Attending]  Diet, Clear Liquid (04-12-22 @ 11:07) [Active]

## 2022-04-12 NOTE — PROGRESS NOTE ADULT - SUBJECTIVE AND OBJECTIVE BOX
Pt with flatus and BM.  Pt complains of abdominal discomfort that has not improved much since yesterday.  Pt would like to advance diet    PAST MEDICAL & SURGICAL HISTORY:  Glaucoma    Pancreatic cancer      MEDICATIONS  (STANDING):  latanoprost 0.005% Ophthalmic Solution 1 Drop(s) Both EYES at bedtime  pantoprazole  Injectable 40 milliGRAM(s) IV Push every 12 hours  piperacillin/tazobactam IVPB.. 3.375 Gram(s) IV Intermittent every 12 hours  sodium chloride 0.9%. 1000 milliLiter(s) (100 mL/Hr) IV Continuous <Continuous>    MEDICATIONS  (PRN):  HYDROmorphone  Injectable 1 milliGRAM(s) IV Push every 4 hours PRN Moderate Pain (4 - 6)  HYDROmorphone  Injectable 2 milliGRAM(s) IV Push every 6 hours PRN Severe Pain (7 - 10)  ondansetron Injectable 4 milliGRAM(s) IV Push every 6 hours PRN Nausea    Vital Signs Last 24 Hrs  T(C): 36.7 (12 Apr 2022 05:15), Max: 36.9 (11 Apr 2022 21:00)  T(F): 98.1 (12 Apr 2022 05:15), Max: 98.4 (11 Apr 2022 21:00)  HR: 86 (12 Apr 2022 05:15) (86 - 95)  BP: 120/64 (12 Apr 2022 05:15) (119/60 - 134/60)  RR: 18 (11 Apr 2022 21:00) (16 - 18)    Physical exam  General: Nontoxic  Skin: Nonjaundiced  HEENT: Sclera white  Chest: CTA bilaterally  Cardio: S1 and S2 RRR  Abd: + bowel sounds. Soft and nondistended. No tenderness. No rebound tenderness or guarding.                        10.7   12.02 )-----------( 53       ( 12 Apr 2022 08:12 )             32.6     04-12    147<H>  |  116<H>  |  54<H>  ----------------------------<  121<H>  4.2   |  19  |  0.7    Ca    8.4<L>      12 Apr 2022 08:12  Phos  2.7     04-11  Mg     2.2     04-11    TPro  5.0<L>  /  Alb  2.7<L>  /  TBili  1.4<H>  /  DBili  x   /  AST  35  /  ALT  70<H>  /  AlkPhos  166<H>  04-12    < from: CT Abdomen and Pelvis w/ Oral Cont (04.11.22 @ 05:26) >  IMPRESSION:    When compared to examination performed 6-7 hours prior:    No definitive extraluminal contrast extravasation was identified to   localize/confirm a site of bowel perforation.  Additional interval changes as above.

## 2022-04-12 NOTE — DIETITIAN INITIAL EVALUATION ADULT - PERTINENT MEDS FT
MEDICATIONS  (STANDING):  latanoprost 0.005% Ophthalmic Solution 1 Drop(s) Both EYES at bedtime  pantoprazole  Injectable 40 milliGRAM(s) IV Push every 12 hours  piperacillin/tazobactam IVPB.. 3.375 Gram(s) IV Intermittent every 12 hours  sodium chloride 0.9%. 1000 milliLiter(s) (100 mL/Hr) IV Continuous <Continuous>    MEDICATIONS  (PRN):  HYDROmorphone  Injectable 1 milliGRAM(s) IV Push every 4 hours PRN Moderate Pain (4 - 6)  HYDROmorphone  Injectable 2 milliGRAM(s) IV Push every 6 hours PRN Severe Pain (7 - 10)  ondansetron Injectable 4 milliGRAM(s) IV Push every 6 hours PRN Nausea

## 2022-04-12 NOTE — CONSULT NOTE ADULT - ASSESSMENT
61 yo female has newly diagnosed pancreatic cancer, s/p stent placement at [facility] on 4/7  CT a/p shows Pneumoperitoneum. ? upper GI perforation or the possibility of biliary perforation given recent stent placement and air within the biliary tree.   Sx following .  Pancreatic mass c/w hx of newly diagnosed met pancreatic cancer with multiple hypovascular liver masses on Ct scan   will review path from [facility].  needs palliative chemo for met pancreatic ca pending path review.  worsening anemia/thrombocytopenia.  r/o hemolysis.  check retic/hapto/LDH/MANOJ  check iron profile/ferritin .  will start chemo as outpt.  pet/ct as outpt   plan of care was explained to pt and her family at length  pt is agreeable to start Rx  please place leila cath when pt is stable  KOKO,f/u with nephro  iv hydration  cont other supportive care .  will f/u

## 2022-04-12 NOTE — PROGRESS NOTE ADULT - NS ATTEND AMEND GEN_ALL_CORE FT
61 yo f with pancreatic cancer, sp stent placement with perforation.  Plan: IV antibiotics, conservative management.  See above for detailed discussion of impression and plans.  I have examined the patient with the fellow and reviewed the note and agree with the findings and recommendations as noted above.

## 2022-04-12 NOTE — DIETITIAN INITIAL EVALUATION ADULT - PERTINENT LABORATORY DATA
04-12    147<H>  |  116<H>  |  54<H>  ----------------------------<  121<H>  4.2   |  19  |  0.7    Ca    8.4<L>      12 Apr 2022 08:12  Phos  2.7     04-11  Mg     2.2     04-11    TPro  5.0<L>  /  Alb  2.7<L>  /  TBili  1.4<H>  /  DBili  x   /  AST  35  /  ALT  70<H>  /  AlkPhos  166<H>  04-12

## 2022-04-12 NOTE — DIETITIAN INITIAL EVALUATION ADULT - ORAL INTAKE PTA/DIET HISTORY
met with patient and   -reports always having a fair appetite at baseline (light eater) though 2 months ago decreased a bit due to back pain/lethargy --> as of 2 weeks ago intake decreased to nothing due to being admitted to hospital and being kept NPO

## 2022-04-12 NOTE — DIETITIAN INITIAL EVALUATION ADULT - ENERGY INTAKE
-Patient NPO on admission --> advanced to clear liquid diet today 4/12 for bowel rest   -patient requesting to have diet advanced -Patient NPO on admission --> advanced to clear liquid diet today 4/12 for bowel rest. Reports having some jello today, some sips of water, some sips of broth-- overall minimal intake even of clears. Says today was the first time shes taken in anything other than water in 2weeks  -patient requesting to have diet advanced <50%  for ~2 weeks prior to admission, now in-house x1day

## 2022-04-12 NOTE — DIETITIAN INITIAL EVALUATION ADULT - NUTRITIONGOAL OUTCOME2
advance diet as tolerated within 2-4days or consider alternate means of nutrition/hydration if unable

## 2022-04-12 NOTE — DIETITIAN INITIAL EVALUATION ADULT - OTHER CALCULATIONS
using ABW 41.6kg -- ENERGY:1108-1386kcal/day (MSJ x1.2-1.5) PROTEIN:50-63g/day (1.2-1.5g/kg) FLUIDS: 1mL/kcal -- with consideration for cancer dx, low weight/BMI, depletions, malnutrition

## 2022-04-12 NOTE — DIETITIAN INITIAL EVALUATION ADULT - OTHER INFO
MEDICAL:   Pt is a 62 year old female with recently diagnosed metastatic pancreatic cancer, s/p biliary decompression with pancreatic stent placement 4/7/22 at [facility] by Dr. Alejandre, now admitted for abdominal pain with pneumoperitoneum  Plan:  Nonoperative management of pneumoperitoneum:  -Bowel rest MEDICAL:   Pt is a 62 year old female with recently diagnosed metastatic pancreatic cancer, s/p biliary decompression with pancreatic stent placement 4/7/22 at [facility] by Dr. Alejandre, now admitted for abdominal pain with pneumoperitoneum  Plan:  Nonoperative management of pneumoperitoneum: -Bowel rest  GI note  Problem 1-Possible contained perforation based off repeat CT scan  Problem 2-Pancreatic mass compatible with provided history of pancreatic cancer.   Multiple hypovascular liver masses, presumably metastatic lesions.  Problem 3-Descending colonic wall thickening, may be reactive or reflect colitis. (asymptomatic)  Rec  Problem 4-Fluid-filled and distended stomach and proximal duodenum may reflect   associated ileus/partial obstruction with fluid extending into the distal   esophagus which may place patient at increased risk for aspiration  Rec  -patient passing flatus having bowel movements

## 2022-04-12 NOTE — DIETITIAN INITIAL EVALUATION ADULT - ADD RECOMMEND
1. Would consider alternate means of nutrition/hydration if unable to tolerate clears/advance diet given hx of ongoing minimal oral intake x2weeks, can consult nutrition support team if PN indicated if patient w/ ileus 2. Outpatient dietitian referral recommended  High risk follow up x4days

## 2022-04-13 NOTE — PROGRESS NOTE ADULT - SUBJECTIVE AND OBJECTIVE BOX
Pt sleeping during exam, spoke with .  He states the pt is tolerating the clear liquids well. Will discuss with Dr. Hay about advancing diet.   denies N/V and states the patient has had dark liquid BM    PAST MEDICAL & SURGICAL HISTORY:  Glaucoma  Pancreatic cancer    MEDICATIONS  (STANDING):  latanoprost 0.005% Ophthalmic Solution 1 Drop(s) Both EYES at bedtime  pantoprazole  Injectable 40 milliGRAM(s) IV Push every 12 hours  piperacillin/tazobactam IVPB.. 3.375 Gram(s) IV Intermittent every 12 hours  sodium chloride 0.9%. 1000 milliLiter(s) (100 mL/Hr) IV Continuous <Continuous>    MEDICATIONS  (PRN):  HYDROmorphone  Injectable 1 milliGRAM(s) IV Push every 4 hours PRN Moderate Pain (4 - 6)  HYDROmorphone  Injectable 2 milliGRAM(s) IV Push every 6 hours PRN Severe Pain (7 - 10)  ondansetron Injectable 4 milliGRAM(s) IV Push every 6 hours PRN Nausea  temazepam 15 milliGRAM(s) Oral at bedtime PRN Insomnia    Vital Signs Last 24 Hrs  T(C): 35.8 (13 Apr 2022 04:55), Max: 37.1 (12 Apr 2022 21:03)  T(F): 96.4 (13 Apr 2022 04:55), Max: 98.8 (12 Apr 2022 21:03)  HR: 80 (13 Apr 2022 04:55) (80 - 94)  BP: 174/74 (13 Apr 2022 04:55) (130/62 - 174/74)  RR: 16 (13 Apr 2022 04:55) (16 - 18)    Physical Exam  General: Sleeping, in no acute distress  Abd: +bowel sounds, soft and nondistended. Surgical site from stent is dry, intact and healing well.                            10.7   12.02 )-----------( 53       ( 12 Apr 2022 08:12 )             32.6     04-12    147<H>  |  116<H>  |  54<H>  ----------------------------<  121<H>  4.2   |  19  |  0.7    Ca    8.4<L>      12 Apr 2022 08:12  Phos  2.7     04-11  Mg     2.2     04-11    TPro  5.0<L>  /  Alb  2.7<L>  /  TBili  1.4<H>  /  DBili  x   /  AST  35  /  ALT  70<H>  /  AlkPhos  166<H>  04-12    Reticulocyte Count in AM (04.13.22 @ 06:38)   RBC Count: 3.58 M/uL   Reticulocyte Percent: 1.7 %   Absolute Reticulocytes: 59.8 K/uL    Dir Antiglob Polyspecific Interpretation: NEG (04.13.22 @ 06:35)    PT/INR - ( 11 Apr 2022 15:45 )   PT: 21.50 sec;   INR: 1.88 ratio    PTT - ( 11 Apr 2022 15:45 )  PTT:27.7 sec      I&O's Detail    12 Apr 2022 07:01  -  13 Apr 2022 07:00  --------------------------------------------------------  IN:  Total IN: 0 mL    OUT:    Indwelling Catheter - Urethral (mL): 1400 mL  Total OUT: 1400 mL    Total NET: -1400 mL

## 2022-04-13 NOTE — CONSULT NOTE ADULT - SUBJECTIVE AND OBJECTIVE BOX
HPI:  · Chief Complaint: The patient is a 62y Female complaining of poor appetite.  · HPI Objective Statement: 62y F pmh- glaucoma,  Pancreatic CANCER- DIAGNOSED  4/4/22, S/P BILIARY STENT PLACEMENT - 4/5/22, S/P PANCREATIC STENT PLACEMENT 4/7/22 -ALL PROCEDURES DONE BY DR JANG  --IR  -AT [facility] presents for eval of weakness., SINCE LAST PROCEDURE has generalized weakness and lightheadedness, aggravated with ambulation, no relieving factors. Associated decreased appetite and abdominal distention. Denies fever, ha, cp, sob, numbness, dysuria, hematuria, n/v/d/c   (11 Apr 2022 05:02)        HPI-Cardiology   Pt evaluated at bedside. Currently admitted for eval of generalized weakness. Radiology tests and hospital records, were reviewed, as well as previous notes on this patient. Consulted for cardiovascular risk stratification before surgical procedure.        PAST MEDICAL & SURGICAL HISTORY  Glaucoma    Pancreatic cancer        FAMILY HISTORY:  FAMILY HISTORY:  unknown        SOCIAL HISTORY:  []smoker-denies  []Alcohol-denies  []Drug-denies        ALLERGIES:  No Known Allergies      MEDICATIONS:  MEDICATIONS  (STANDING):  latanoprost 0.005% Ophthalmic Solution 1 Drop(s) Both EYES at bedtime  pantoprazole  Injectable 40 milliGRAM(s) IV Push every 12 hours  piperacillin/tazobactam IVPB.. 3.375 Gram(s) IV Intermittent every 12 hours  sodium chloride 0.9%. 1000 milliLiter(s) (100 mL/Hr) IV Continuous <Continuous>    MEDICATIONS  (PRN):  HYDROmorphone  Injectable 1 milliGRAM(s) IV Push every 4 hours PRN Moderate Pain (4 - 6)  HYDROmorphone  Injectable 2 milliGRAM(s) IV Push every 6 hours PRN Severe Pain (7 - 10)  ondansetron Injectable 4 milliGRAM(s) IV Push every 6 hours PRN Nausea  temazepam 15 milliGRAM(s) Oral at bedtime PRN Insomnia      HOME MEDICATIONS:  Home Medications:      VITALS:   T(F): 96.5 (04-13 @ 14:38), Max: 98.8 (04-12 @ 21:03)  HR: 90 (04-13 @ 14:38) (80 - 104)  BP: 156/88 (04-13 @ 14:38) (78/43 - 174/74)  BP(mean): --  RR: 16 (04-13 @ 14:38) (16 - 18)  SpO2: 97% (04-11 @ 00:15) (96% - 99%)    I&O's Summary    12 Apr 2022 07:01  -  13 Apr 2022 07:00  --------------------------------------------------------  IN: 0 mL / OUT: 1400 mL / NET: -1400 mL        REVIEW OF SYSTEMS:  CONSTITUTIONAL: Feels weak and tired  EYES: No visual changes  ENT: No vertigo or throat pain   NECK: No pain or stiffness  RESPIRATORY: No cough, wheezing, hemoptysis; No shortness of breath  CARDIOVASCULAR: No chest pain or palpitations  GASTROINTESTINAL: No abdominal or epigastric pain. No nausea, vomiting, or hematemesis; No diarrhea or constipation. No melena or hematochezia.  GENITOURINARY: No dysuria, frequency or hematuria  NEUROLOGICAL: No numbness or weakness  SKIN: No itching, no rashes  MSK: no    PHYSICAL EXAM:  NEURO: patient is awake , alert and oriented  GEN: Not in acute distress, appears weak  NECK: no thyroid enlargement, no JVD  LUNGS: Clear to auscultation bilaterally   CARDIOVASCULAR: S1/S2 present, RRR , no murmurs or rubs, no carotid bruits,  + PP bilaterally  ABD: Soft, non-tender, non-distended, +BS  EXT: No ESTUARDO  SKIN: Intact    LABS:                        10.9   13.68 )-----------( 43       ( 13 Apr 2022 13:56 )             33.4     04-13    145  |  112<H>  |  29<H>  ----------------------------<  102<H>  3.7   |  18  |  0.6<L>    Ca    8.2<L>      13 Apr 2022 13:56    TPro  5.0<L>  /  Alb  2.7<L>  /  TBili  1.4<H>  /  DBili  x   /  AST  35  /  ALT  70<H>  /  AlkPhos  166<H>  04-12        RADIOLOGY:  -CXR:  < from: Xray Chest 1 View AP/PA (04.10.22 @ 19:18) >    Impression:    No radiographic evidence of acute cardiopulmonary disease.        --- End of Report ---      < end of copied text >        ECG:  < from: 12 Lead ECG (04.10.22 @ 19:18) >  Normal sinus rhythm  Possible Left atrial enlargement  Nonspecific ST and T wave abnormality  Abnormal ECG    Confirmed by STEPHANIE BLOCK MD ([id number]) on 4/11/2022 12:21:38 PM    < end of copied text >

## 2022-04-13 NOTE — CONSULT NOTE ADULT - SUBJECTIVE AND OBJECTIVE BOX
DILIP ROBERTS          MRN-[id number]              HPI: 62F with PMH of glaucoma, pancreatic CA (diagnosed 22) s/p pancreatic and biliary stents, here with weakness and abdominal pain, due to possible contained perforation, on IV abx and conservative management. Oncology is planning for palliative chemotherapy. Palliative consulted for GOC.        PAST MEDICAL & SURGICAL HISTORY:  Glaucoma    Pancreatic cancer        FAMILY HISTORY:   Reviewed and found non contributory in mother or father    SOCIAL HISTORY: no tobacco/etoh use reported. Pt resides at ______       ROS:	    Unable to attain due to:                      Dyspnea (Rebecca 0-10): 0                       N/V (Y/N): No                             Secretions (Y/N) : No                                          Agitation(Y/N): No                              Pain (Y/N): No                                 -Provocation/Palliation: N/A  -Quality/Quantity: N/A  -Radiating: N/A  -Severity: No pain  -Timing/Frequency: N/A  -Impact on ADLs: N/A    General:  Denied  HEENT:    Denied  Neck:  Denied  CVS:  Denied  Resp:  Denied  GI:  Denied    :  Denied  Musc:  Denied  Neuro:  Denied  Psych:  Denied  Skin:  Denied  Lymph:  Denied    Last BM:      Allergies    No Known Allergies    Intolerances      Opiate Naive (Y/N):   -iStop reviewed (Y/N):   Ref#:              Medications:	      MEDICATIONS  (STANDING):  latanoprost 0.005% Ophthalmic Solution 1 Drop(s) Both EYES at bedtime  pantoprazole  Injectable 40 milliGRAM(s) IV Push every 12 hours  piperacillin/tazobactam IVPB.. 3.375 Gram(s) IV Intermittent every 12 hours  sodium chloride 0.9%. 1000 milliLiter(s) (100 mL/Hr) IV Continuous <Continuous>    MEDICATIONS  (PRN):  HYDROmorphone  Injectable 1 milliGRAM(s) IV Push every 4 hours PRN Moderate Pain (4 - 6)  HYDROmorphone  Injectable 2 milliGRAM(s) IV Push every 6 hours PRN Severe Pain (7 - 10)  ondansetron Injectable 4 milliGRAM(s) IV Push every 6 hours PRN Nausea  temazepam 15 milliGRAM(s) Oral at bedtime PRN Insomnia      Labs:	    CBC:                        10.9   13.68 )-----------( 43       ( 2022 13:56 )             33.4     CMP:    04-13    145  |  112<H>  |  29<H>  ----------------------------<  102<H>  3.7   |  18  |  0.6<L>    Ca    8.2<L>      2022 13:56  Phos  2.7       Mg     2.2         TPro  5.0<L>  /  Alb  2.7<L>  /  TBili  1.4<H>  /  DBili  x   /  AST  35  /  ALT  70<H>  /  AlkPhos  166<H>         PT/INR - ( 2022 15:45 )   PT: 21.50 sec;   INR: 1.88 ratio         PTT - ( 2022 15:45 )  PTT:27.7 sec           Radiology:	       EK Lead ECG:   Ventricular Rate 85 BPM    Atrial Rate 85 BPM    P-R Interval 118 ms    QRS Duration 70 ms    Q-T Interval 508 ms    QTC Calculation(Bazett) 604 ms    P Axis 82 degrees    R Axis 71 degrees    T Axis 55 degrees    Diagnosis Line Normal sinus rhythm  Possible Left atrial enlargement  Nonspecific ST and T wave abnormality  Abnormal ECG    Confirmed by STEPHANIE BLOCK MD ([id number]) on 2022 12:21:38 PM (04-10-22 @ 19:18)      Imaging Personally Reviewed:  [ ] YES  [ ] NO    Consultant(s) Notes Reviewed:  [ ] YES  [ ] NO  Care Discussed with Consultants/Other Providers [ ] YES  [ ] NO    PEx:	  T(C): 35.8 (22 @ 14:38), Max: 37.1 (22 @ 21:03)  HR: 90 (22 @ 14:38) (80 - 90)  BP: 156/88 (22 @ 14:38) (141/68 - 174/74)  RR: 16 (22 @ 14:38) (16 - 16)  SpO2: --  Wt(kg): --  Daily     Daily     General:  found in bed in NAD  HEENT:  NCAT PERRL EOMI Non icteric, no noted thrush  CVS: RR S1S2 No M/G/R  Resp: Unlabored Non tachypneic No increased WOB  GI:  Soft NT ND BS+  :  Voiding / Block / PrimaFit  Musc: No C/C/E    Neuro: Follows commands No focal deficits  Psych: Calm Pleasant, AAOx3    Skin: Non jaundiced , no rash   Lymph: no adenopathy     Preadmit Karnofsky:  %           Current Karnofsky:     %  http://www.npcrc.org/files/news/karnofsky_performance_scale.pdf   http://www.npcrc.org/files/news/palliative_performance_scale_PPSv2.pdf  Cachexia (Y/N):   BMI:          Advanced Directives:	     Full Code     DNR/DNI     MOLST     HCP     DPOA     Living Will     Decision maker: The patient is able to participate in complex medical decision making conversations.   Legal surrogate:    GOALS OF CARE DISCUSSION	       Palliative care info/counseling provided	           Family meeting       Advanced Directives addressed please see Advance Care Planning Note	           See previous Palliative Medicine Note       Documentation of GOC: 	    REFERRALS	        Palliative Med        Unit SW/Case Mgmt              Hospice       Speech/Swallow       Nutrition       PT/OT DILIP ROBERTS          [id number]              HPI: 62F with PMH of glaucoma, pancreatic CA (diagnosed 20) s/p pancreatic and biliary stents, here with weakness and abdominal pain, due to possible contained perforation, on IV abx and conservative management. Oncology is planning for palliative chemotherapy. Palliative consulted for GOC.    PAST MEDICAL & SURGICAL HISTORY:  Glaucoma    Pancreatic cancer        FAMILY HISTORY:  No GI cancers in first or second degree relatives    SOCIAL HISTORY: no tobacco/etoh use reported. Pt resides at ______       ROS:	    Unable to attain due to:      Fatigue: Y                  Dyspnea (Rebecca 0-10): 0                       N/V (Y/N): No                             Secretions (Y/N) : No                                          Agitation(Y/N): No                              Pain (Y/N): No                                 -Provocation/Palliation: N/A  -Quality/Quantity: N/A  -Radiating: N/A  -Severity: No pain  -Timing/Frequency: N/A  -Impact on ADLs: N/A    General:  Denied  HEENT:    Denied  Neck:  Denied  CVS:  Denied  Resp:  Denied  GI:  Denied    :  Denied  Musc:  Denied  Neuro:  Denied  Psych:  Denied  Skin:  Denied  Lymph:  Denied    Last BM:      Allergies    No Known Allergies    Intolerances      Opiate Naive (Y/N):   -iStop reviewed (Y/N):   Ref#:              Medications:	      MEDICATIONS  (STANDING):  latanoprost 0.005% Ophthalmic Solution 1 Drop(s) Both EYES at bedtime  pantoprazole  Injectable 40 milliGRAM(s) IV Push every 12 hours  piperacillin/tazobactam IVPB.. 3.375 Gram(s) IV Intermittent every 12 hours  sodium chloride 0.9%. 1000 milliLiter(s) (100 mL/Hr) IV Continuous <Continuous>    MEDICATIONS  (PRN):  HYDROmorphone  Injectable 1 milliGRAM(s) IV Push every 4 hours PRN Moderate Pain (4 - 6)  HYDROmorphone  Injectable 2 milliGRAM(s) IV Push every 6 hours PRN Severe Pain (7 - 10)  ondansetron Injectable 4 milliGRAM(s) IV Push every 6 hours PRN Nausea  temazepam 15 milliGRAM(s) Oral at bedtime PRN Insomnia      Labs:	  reviewed  CBC:                        10.9   13.68 )-----------( 43       ( 2022 13:56 )             33.4     CMP:        145  |  112<H>  |  29<H>  ----------------------------<  102<H>  3.7   |  18  |  0.6<L>    Ca    8.2<L>      2022 13:56  Phos  2.7       Mg     2.2         TPro  5.0<L>  /  Alb  2.7<L>  /  TBili  1.4<H>  /  DBili  x   /  AST  35  /  ALT  70<H>  /  AlkPhos  166<H>         PT/INR - ( 2022 15:45 )   PT: 21.50 sec;   INR: 1.88 ratio         PTT - ( 2022 15:45 )  PTT:27.7 sec           Radiology:	       EK Lead ECG:   Ventricular Rate 85 BPM    Atrial Rate 85 BPM    P-R Interval 118 ms    QRS Duration 70 ms    Q-T Interval 508 ms    QTC Calculation(Bazett) 604 ms    P Axis 82 degrees    R Axis 71 degrees    T Axis 55 degrees    Diagnosis Line Normal sinus rhythm  Possible Left atrial enlargement  Nonspecific ST and T wave abnormality  Abnormal ECG    Confirmed by STEPHANIE BLOCK MD ([id number]) on 2022 12:21:38 PM (04-10-22 @ 19:18)      Imaging Personally Reviewed:  [ ] YES  [ ] NO    Consultant(s) Notes Reviewed:  [ ] YES  [ ] NO  Care Discussed with Consultants/Other Providers [ ] YES  [ ] NO    PEx:	  T(C): 35.8 (22 @ 14:38), Max: 37.1 (22 @ 21:03)  HR: 90 (22 @ 14:38) (80 - 90)  BP: 156/88 (22 @ 14:38) (141/68 - 174/74)  RR: 16 (22 @ 14:38) (16 - 16)  SpO2: --  Wt(kg): --  Daily     Daily     General:  NAD  Eyes:  clear conjunctiva  ENMT: grossly normal   Resp: Unlabored Non tachypneic   GI:  nondistended  Neuro: grossly WNL  Psych: Calm   Skin: nonjaundiced    Preadmit Karnofsky:  %           Current Karnofsky:     %  http://www.npcrc.org/files/news/karnofsky_performance_scale.pdf   http://www.npcrc.org/files/news/palliative_performance_scale_PPSv2.pdf  Cachexia (Y/N):   BMI:          Advanced Directives:	     Full Code     DNR/DNI     MOLST     HCP     DPOA     Living Will     Decision maker: The patient is able to participate in complex medical decision making conversations.   Legal surrogate:    GOALS OF CARE DISCUSSION	       Palliative care info/counseling provided	           Family meeting       Advanced Directives addressed please see Advance Care Planning Note	           See previous Palliative Medicine Note       Documentation of GOC: 	    REFERRALS	        Palliative Med        Unit SW/Case Mgmt              Hospice       Speech/Swallow       Nutrition       PT/OT

## 2022-04-13 NOTE — PROGRESS NOTE ADULT - SUBJECTIVE AND OBJECTIVE BOX
Patient is a 62y old  Female who presents with a chief complaint of Pancreatic ca and abd pain (13 Apr 2022 10:20)       Pt is seen and examined  pt is awake and lying in bed        ROS:  Negative except for:abdominal discomfort    MEDICATIONS  (STANDING):  latanoprost 0.005% Ophthalmic Solution 1 Drop(s) Both EYES at bedtime  pantoprazole  Injectable 40 milliGRAM(s) IV Push every 12 hours  piperacillin/tazobactam IVPB.. 3.375 Gram(s) IV Intermittent every 12 hours  sodium chloride 0.9%. 1000 milliLiter(s) (100 mL/Hr) IV Continuous <Continuous>    MEDICATIONS  (PRN):  HYDROmorphone  Injectable 1 milliGRAM(s) IV Push every 4 hours PRN Moderate Pain (4 - 6)  HYDROmorphone  Injectable 2 milliGRAM(s) IV Push every 6 hours PRN Severe Pain (7 - 10)  ondansetron Injectable 4 milliGRAM(s) IV Push every 6 hours PRN Nausea  temazepam 15 milliGRAM(s) Oral at bedtime PRN Insomnia      Allergies    No Known Allergies    Intolerances        Vital Signs Last 24 Hrs  T(C): 35.8 (13 Apr 2022 04:55), Max: 37.1 (12 Apr 2022 21:03)  T(F): 96.4 (13 Apr 2022 04:55), Max: 98.8 (12 Apr 2022 21:03)  HR: 80 (13 Apr 2022 04:55) (80 - 94)  BP: 174/74 (13 Apr 2022 04:55) (130/62 - 174/74)  BP(mean): --  RR: 16 (13 Apr 2022 04:55) (16 - 18)  SpO2: --    PHYSICAL EXAM  General: adult in NAD  HEENT: clear oropharynx, anicteric sclera, pink conjunctiva  Neck: supple  CV: normal S1/S2 with no murmur rubs or gallops  Lungs: positive air movement b/l ant lungs,clear to auscultation, no wheezes, no rales  Abdomen: soft non-tender non-distended, no hepatosplenomegaly  Ext: no clubbing cyanosis or edema  Skin: no rashes and no petechiae  Neuro: alert and oriented X 4, no focal deficits  LABS:                          10.7   12.02 )-----------( 53       ( 12 Apr 2022 08:12 )             32.6         Mean Cell Volume : 92.6 fL  Mean Cell Hemoglobin : 30.4 pg  Mean Cell Hemoglobin Concentration : 32.8 g/dL  Auto Neutrophil # : x  Auto Lymphocyte # : x  Auto Monocyte # : x  Auto Eosinophil # : x  Auto Basophil # : x  Auto Neutrophil % : x  Auto Lymphocyte % : x  Auto Monocyte % : x  Auto Eosinophil % : x  Auto Basophil % : x    Serial CBC's  04-13 @ 06:38  Hct--- / Hgb--- / Plat--- / RBC-3.58 / WBC---          Serial CBC's  04-12 @ 08:12  Hct-32.6 / Hgb-10.7 / Plat-53 / RBC-3.52 / WBC-12.02          Serial CBC's  04-11 @ 15:41  Hct-34.7 / Hgb-11.4 / Plat-75 / RBC-3.77 / WBC-11.36          Serial CBC's  04-10 @ 19:45  Hct-39.6 / Hgb-12.6 / Plat-124 / RBC-4.19 / WBC-10.86            04-12    147<H>  |  116<H>  |  54<H>  ----------------------------<  121<H>  4.2   |  19  |  0.7    Ca    8.4<L>      12 Apr 2022 08:12  Phos  2.7     04-11  Mg     2.2     04-11    TPro  5.0<L>  /  Alb  2.7<L>  /  TBili  1.4<H>  /  DBili  x   /  AST  35  /  ALT  70<H>  /  AlkPhos  166<H>  04-12      PT/INR - ( 11 Apr 2022 15:45 )   PT: 21.50 sec;   INR: 1.88 ratio         PTT - ( 11 Apr 2022 15:45 )  PTT:27.7 sec    Reticulocyte Percent: 1.7 % (04-13-22 @ 06:38)  WBC Count: 12.02 K/uL (04-12-22 @ 08:12)  Hemoglobin: 10.7 g/dL (04-12-22 @ 08:12)  Hematocrit: 32.6 % (04-12-22 @ 08:12)  Platelet Count - Automated: 53 K/uL (04-12-22 @ 08:12)  WBC Count: 11.36 K/uL (04-11-22 @ 15:41)  Hemoglobin: 11.4 g/dL (04-11-22 @ 15:41)  Hematocrit: 34.7 % (04-11-22 @ 15:41)  Platelet Count - Automated: 75 K/uL (04-11-22 @ 15:41)  Hemoglobin: 12.6 g/dL (04-10-22 @ 19:45)  Hematocrit: 39.6 % (04-10-22 @ 19:45)  Platelet Count - Automated: 124 K/uL (04-10-22 @ 19:45)  WBC Count: 10.86 K/uL (04-10-22 @ 19:45)                BLOOD SMEAR INTERPRETATION:       RADIOLOGY & ADDITIONAL STUDIES:

## 2022-04-13 NOTE — CONSULT NOTE ADULT - CONVERSATION DETAILS
Discussed ACP with  and patient. They state  would be decision maker, and believe they signed a HCP in past. Introduced ACP, including DNR/DNI, and  understood importance of that decision, and stated they would discuss at a later point.

## 2022-04-13 NOTE — CONSULT NOTE ADULT - ASSESSMENT
62y F PMH of glaucoma,  Pancreatic CANCER- DIAGNOSED  4/4/22, S/P BILIARY STENT PLACEMENT - 4/5/22, S/P PANCREATIC STENT PLACEMENT 4/7/22, was admitted for eval of generalized weakness. Scheduled for port-a-cath  placement on Friday 4/15      #Cardiovascular Risk Stratification      - Currently no active cardiac conditions. No signs of ischemia, clinical exam not consistent with ADHF, and no stable arrhythmias noted.  - Able to walk >4 METS without any anginal symptoms.   - RCRI score: 0(Class I Risk): 3.9% risk of YVETTE karla-op/post-op  - Self Perioperative score: 0.1% risk of YVETTE karla-op/post-op  - Further cardiac workup will depend on clinical course  - All other workup per primary team    #Pt is at low risk of YVETTE karla-op/post-op    Discussed with Dr Sullivan

## 2022-04-13 NOTE — CONSULT NOTE ADULT - PROBLEM SELECTOR RECOMMENDATION 9
- can c/w IV dilaudid, but opioid naive per ISTOP (#[id number]), would consider dilaudid 0.5mg IV Q3 PRN moderate-severe pain, with uptitration if needed  - monitor BMs  - patient has not utilized dilaudid yet

## 2022-04-13 NOTE — CONSULT NOTE ADULT - NS ATTEND AMEND GEN_ALL_CORE FT
Patient with panc ca. Recent panc stent. Patient weak cachectic. Not eating . She denies chest pain. She gets ALVARENGA. Lying flat . No sob. She needs a maria de jesus cath. Need watch renal fuction. Give fluid if needed. Alb low . Watch for chf . Note low plt. Cardiac risk surgery low to intermediate. Prognosis guarded
I edited the note

## 2022-04-13 NOTE — CONSULT NOTE ADULT - PROBLEM SELECTOR RECOMMENDATION 4
- will follow    ______________  Zana Jacob MD  Palliative Medicine  [facility]   of Ger[facility] Pall[facility]  [phone]

## 2022-04-13 NOTE — CONSULT NOTE ADULT - ASSESSMENT
62F with PMH of glaucoma, pancreatic CA (diagnosed 4/4/22) s/p pancreatic and biliary stents, here with weakness and abdominal pain, due to possible contained perforation, on IV abx and conservative management. Oncology is planning for palliative chemotherapy. Palliative consulted for GOC.

## 2022-04-13 NOTE — CHART NOTE - NSCHARTNOTEFT_GEN_A_CORE
PALLIATIVE MEDICINE INTERDISCIPLINARY TEAM NOTE    Provider:  [   ]Social Work   [   ]          [   ] Initial visit [   ] Follow up    Family or contact name / phone #   Met with: [   ] Patient  [   ] Family  [   ] Other:    Primary Language: [   ] English [   ] Other*:                      *Interpretation provided by:    SUPPORT DIAGNOSES            (Check all that apply)  [   ] Psychosocial spiritual assessment (PSSA)  [   ] EOL issues  [   ] Cultural / spiritual concerns  [   ] Pain / suffering  [   ] Dementia / AMS  [   ] Other:  [   ] AD issues  [   ] Grief / loss / sadness  [   ] Discharge issues  [   ] Distress / coping    PSYCHOSOCIAL ASSESSMENT OF PATIENT         (Check all that apply)  [   ] Initial Assessment            [   ] Reassessment          [   ] Not Applicable this visit    Pain/suffering acuity:  [   ] None to mild (0-3)           [   ] Moderate (4-6)        [   ] High (7-10)    Mental Status:  [   ] Alert/oriented (x3)          [   ] Confused/Altered(x2/x1)         [   ] Non-resp    Functional status:  [   ] Independent w ADLs      [   ] Needs Assistance             [   ] Bedbound/Full Care    Coping:  [   ] Coping well                     [   ] Coping w/difficulty            [   ] Poor coping    Support system:  [   ] Strong                              [   ] Adequate                        [   ] Inadequate    SPIRITUAL ASSESSMENT  Hoahaoism/Spiritual practice: ___Catholic________________________    Role of organized Yazidism:  [   ] Important                     [ x  ] Some (fam tradition, cultural)               [   ] None    Effects on medical care:  [   ] Yes, _____________________________________                         [  x ] None    Cultural/Voodoo need:  [   ] Yes, _____________________________________                         [ x  ] None    Refer to Pastoral Care:  [   ] Yes           [ x  ] No, not at this time    SERVICE PROVIDED  [   ]PSSA                                                                             [   ]Discharge support / facilitation  [   ]AD / goals of care counseling                                  [   ]EOL / death / bereavement counseling  [   ]Counseling / support                                                [   ] Family meeting  [ x  ]Prayer / sacrament / ritual                                      [   ] Referral   [   ]Other                                                                       NOTE and Plan of Care (PoC): Pt. states she's comfortable. Pt's spouse and his sister were present at time of visit. I was a pastoral presence. I will follow up as needed.

## 2022-04-14 NOTE — PHYSICAL THERAPY INITIAL EVALUATION ADULT - PERTINENT HX OF CURRENT PROBLEM, REHAB EVAL
· HPI Objective Statement: 62y F pmh- glaucoma,  Pancreatic CANCER- DIAGNOSED  4/4/22, S/P BILIARY STENT PLACEMENT - 4/5/22, S/P PANCREATIC STENT PLACEMENT 4/7/22 -ALL PROCEDURES DONE BY DR JANG  --IR  -AT [facility] presents for eval of weakness., SINCE LAST PROCEDURE has generalized weakness and lightheadedness, aggravated with ambulation, no relieving factors. Associated decreased appetite and abdominal distention. Denies fever, ha, cp, sob, numbness, dysuria, hematuria, n/v/d/c

## 2022-04-14 NOTE — CONSULT NOTE ADULT - SUBJECTIVE AND OBJECTIVE BOX
DILIP ROBERTS  62y, Female  Allergy: No Known Allergies      CHIEF COMPLAINT: Pancreatic ca and abd pain (13 Apr 2022 10:20)      LOS  3d    HPI:  · Chief Complaint: The patient is a 62y Female complaining of poor appetite.  · HPI Objective Statement: 62y F pmh- glaucoma,  Pancreatic CANCER- DIAGNOSED  4/4/22, S/P BILIARY STENT PLACEMENT - 4/5/22, S/P PANCREATIC STENT PLACEMENT 4/7/22 -ALL PROCEDURES DONE BY DR JANG  --IR  -AT [facility] presents for eval of weakness., SINCE LAST PROCEDURE has generalized weakness and lightheadedness, aggravated with ambulation, no relieving factors. Associated decreased appetite and abdominal distention. Denies fever, ha, cp, sob, numbness, dysuria, hematuria, n/v/d/c   (11 Apr 2022 05:02)      INFECTIOUS DISEASE HISTORY:  History as above   Underwent biliary stent placement 4/5, pancreatic stent placement 4/7.   Presents with worsening abdominal pain.   Found to have evidence of pneumoperitoneum although unable to localize.   ID consulted for worsening leukocytosis.   She denies any worsening abdominal pain, nausea, vomiting.   Denies any diarrhea.    reports stool appears very dark.       PAST MEDICAL & SURGICAL HISTORY:  Glaucoma    Pancreatic cancer        FAMILY HISTORY  Family Hx reivewed and non-contributory     SOCIAL HISTORY  Social History:  Denies eoth use, drug use      ROS  General: Denies rigors, nightsweats  HEENT: Denies headache, rhinorrhea, sore throat, eye pain  CV: Denies CP, palpitations  PULM: Denies wheezing, hemoptysis  GI: Denies hematemesis, hematochezia, melena  : Denies discharge, hematuria  MSK: Denies arthralgias, myalgias  SKIN: Denies rash, lesions  NEURO: Denies paresthesias, weakness  PSYCH: Denies depression, anxiety    VITALS:  T(F): 98.4, Max: 98.6 (04-13-22 @ 21:01)  HR: 95  BP: 134/78  RR: 16Vital Signs Last 24 Hrs  T(C): 36.9 (14 Apr 2022 14:24), Max: 37 (13 Apr 2022 21:01)  T(F): 98.4 (14 Apr 2022 14:24), Max: 98.6 (13 Apr 2022 21:01)  HR: 95 (14 Apr 2022 14:24) (91 - 95)  BP: 134/78 (14 Apr 2022 14:24) (129/60 - 150/66)  BP(mean): --  RR: 16 (14 Apr 2022 14:24) (16 - 16)  SpO2: --    PHYSICAL EXAM:  Gen: NAD, resting in bed  HEENT: Normocephalic, atraumatic  Neck: supple, no lymphadenopathy  CV: Regular rate & regular rhythm  Lungs: decreased BS at bases, no fremitus  Abdomen: Soft, BS present  Ext: Warm, well perfused  Neuro: non focal, awake  Skin: no rash, no erythema  Lines: no phlebitis    TESTS & MEASUREMENTS:                        10.9   19.91 )-----------( 27       ( 14 Apr 2022 08:56 )             33.8     04-14    151<H>  |  118<H>  |  18  ----------------------------<  137<H>  3.4<L>   |  21  |  0.6<L>    Ca    7.9<L>      14 Apr 2022 08:56  Phos  2.6     04-14  Mg     1.6     04-14              Culture - Blood (collected 04-10-22 @ 21:45)  Source: .Blood Blood-Peripheral  Preliminary Report (04-12-22 @ 20:01):    No growth to date.    Culture - Blood (collected 04-10-22 @ 21:45)  Source: .Blood Blood-Peripheral  Preliminary Report (04-12-22 @ 20:01):    No growth to date.        Lactate, Blood: 1.1 mmol/L (04-11-22 @ 15:45)  Lactate, Blood: 1.8 mmol/L (04-11-22 @ 02:15)  Blood Gas Venous - Lactate: 3.90 mmol/L (04-10-22 @ 20:23)  Blood Gas Venous - Lactate: 4.70 mmol/L (04-10-22 @ 18:32)      INFECTIOUS DISEASES TESTING  Clostridium difficile Toxin by PCR: RESULT INTERPRETATION:    Not Detected - No Clostridium difficile toxins detected by amplified DNA  PCR    C. difficile PCR test results should be interpreted only with  consideration of the patient's clinical situation and history.  This test  will detect the presence of toxigenic C. difficle.  However it cannot be  used as the sole criteria  for the diagnosis of antibiotic associated diarrhea, antibiotic  associated colitis, or pseudomembranous colitis.  Colonization with  C.difficile may exceed 20% in hospital patients, the majority of whom are  without Toxigenic  Clostridium Diffidisease.  Testing is generally not recommended in  children below the age of 1 year, as up to half of healthy infants are  asymptomatically colonized withC.difficile.  In addition, C.difficile  PCR testing  cannot be used as a "test of cure" as dead organism nucleic acids will  persist and be detected after treatment.  Successful treatment of  C.difficile disease is determined by resolution of clinical symptoms. Method: CDPCR  TOXIGENIC CLOST.DIFFICILE NEGATIVE;  027-NAP1-B1 PRESUMPTIVE NEGATIVE.  By: Real-Time PCR (Polymerase Chain Reaction)  NOTE: This method detects the Toxin B gene (tCdB), the  binary toxin gene (CDT), and the single-base-pair  deletion at nucleotide 117 within the gene encoding  a negative regulator of toxin production (tcdC 117).  The combined presence of genes encoding Toxin B and  binary toxin and the tcdC 117 deletion has been  associated with hypervirulent C. difficile strain  known as 027/NAP1/B1, which has been associated with  severe disease outbreaks in healthcare facilities  worldwide. (04-10-22 @ 22:00)  COVID-19 PCR: NotDetec (04-10-22 @ 20:25)      RADIOLOGY & ADDITIONAL TESTS:  I have personally reviewed the last Chest xray  CXR      CT      CARDIOLOGY TESTING  12 Lead ECG:   Ventricular Rate 85 BPM    Atrial Rate 85 BPM    P-R Interval 118 ms    QRS Duration 70 ms    Q-T Interval 508 ms    QTC Calculation(Bazett) 604 ms    P Axis 82 degrees    R Axis 71 degrees    T Axis 55 degrees    Diagnosis Line Normal sinus rhythm  Possible Left atrial enlargement  Nonspecific ST and T wave abnormality  Abnormal ECG    Confirmed by STEPHANIE BLOCK MD ([id number]) on 4/11/2022 12:21:38 PM (04-10-22 @ 19:18)      MEDICATIONS  latanoprost 0.005% Ophthalmic Solution 1 Both EYES at bedtime  pantoprazole  Injectable 40 IV Push every 12 hours  piperacillin/tazobactam IVPB.. 3.375 IV Intermittent every 12 hours  sodium chloride 0.9%. 1000 IV Continuous <Continuous>      Weight  Weight (kg): 41.6 (04-11-22 @ 05:25)    ANTIBIOTICS:  piperacillin/tazobactam IVPB.. 3.375 Gram(s) IV Intermittent every 12 hours      ALLERGIES:  No Known Allergies

## 2022-04-14 NOTE — PROGRESS NOTE ADULT - SUBJECTIVE AND OBJECTIVE BOX
GENERAL SURGERY PROGRESS NOTE    Patient: DILIP ROBERTS , 62y ([birth year])Female   MRN: [id number]  Location: [place] [address]  Visit: 04-11-22 Inpatient  Date: 04-14-22 @ 15:12    Hospital Day #: 4  Post-Op Day #:    Procedure/Dx/Injuries:     Events of past 24 hours: NAEON, VSS, tolerating clear liquids. No abdominal pain this AM. Platelets however, downtrending will order thrombocytopenia workup per Hematology    PAST MEDICAL & SURGICAL HISTORY:  Glaucoma    Pancreatic cancer        Vitals:   T(F): 98.4 (04-14-22 @ 14:24), Max: 98.6 (04-13-22 @ 21:01)  HR: 95 (04-14-22 @ 14:24)  BP: 134/78 (04-14-22 @ 14:24)  RR: 16 (04-14-22 @ 14:24)  SpO2: --      Diet, Clear Liquid:   Prosource Gelatein Plus     Qty per Day:  3x  Supplement Feeding Modality:  Oral  Ensure Clear Cans or Servings Per Day:  3       Frequency:  Daily      Fluids:     I & O's:    04-13-22 @ 07:01  -  04-14-22 @ 07:00  --------------------------------------------------------  IN:  Total IN: 0 mL    OUT:    Indwelling Catheter - Urethral (mL): 1160 mL  Total OUT: 1160 mL    Total NET: -1160 mL        Bowel Movement: : [] YES [] NO  Flatus: : [] YES [] NO    PHYSICAL EXAM:  General: NAD, AAOx3, calm and cooperative  HEENT: NCAT, KHURRAM, EOMI, Trachea ML, Neck supple  Cardiac: RRR S1, S2, no Murmurs, rubs or gallops  Respiratory: CTAB, normal respiratory effort, breath sounds equal BL, no wheeze, rhonchi or crackles  Abdomen: Soft, non-distended, non-tender, no rebound, no guarding.   Musculoskeletal: Strength 5/5 BL UE/LE, ROM intact, compartments soft  Neuro: Sensation grossly intact and equal throughout, no focal deficits  Vascular: Pulses 2+ throughout, extremities well perfused  Skin: Warm/dry, normal color, no jaundice    MEDICATIONS  (STANDING):  latanoprost 0.005% Ophthalmic Solution 1 Drop(s) Both EYES at bedtime  pantoprazole  Injectable 40 milliGRAM(s) IV Push every 12 hours  piperacillin/tazobactam IVPB.. 3.375 Gram(s) IV Intermittent every 12 hours  potassium chloride  20 mEq/100 mL IVPB 20 milliEquivalent(s) IV Intermittent every 2 hours  sodium chloride 0.9%. 1000 milliLiter(s) (100 mL/Hr) IV Continuous <Continuous>    MEDICATIONS  (PRN):  HYDROmorphone  Injectable 1 milliGRAM(s) IV Push every 4 hours PRN Moderate Pain (4 - 6)  HYDROmorphone  Injectable 2 milliGRAM(s) IV Push every 6 hours PRN Severe Pain (7 - 10)  ondansetron Injectable 4 milliGRAM(s) IV Push every 6 hours PRN Nausea  temazepam 15 milliGRAM(s) Oral at bedtime PRN Insomnia      DVT PROPHYLAXIS:   GI PROPHYLAXIS: pantoprazole  Injectable 40 milliGRAM(s) IV Push every 12 hours    ANTICOAGULATION:   ANTIBIOTICS:  piperacillin/tazobactam IVPB.. 3.375 Gram(s)            LAB/STUDIES:  Labs:  CAPILLARY BLOOD GLUCOSE                              10.9   19.91 )-----------( 27       ( 14 Apr 2022 08:56 )             33.8         04-14    151<H>  |  118<H>  |  18  ----------------------------<  137<H>  3.4<L>   |  21  |  0.6<L>      Calcium, Total Serum: 7.9 mg/dL (04-14-22 @ 08:56)      LFTs:     Lactate, Blood: 1.1 mmol/L (04-11-22 @ 15:45)      Coags:     20.10  ----< 1.76    ( 14 Apr 2022 08:56 )     26.0

## 2022-04-14 NOTE — CONSULT NOTE ADULT - ASSESSMENT
ASSESSMENT   62y F pmh- glaucoma,  Pancreatic CANCER- DIAGNOSED  4/4/22, S/P BILIARY STENT PLACEMENT - 4/5/22, S/P PANCREATIC STENT PLACEMENT 4/7/22 -ALL PROCEDURES DONE BY DR JANG  --IR  -AT [facility] presents for eval of weakness.,    IMPRESSION  #Pancreatic cancer   - s/p biliary stent 4/5/2022  - s/p pancreatic stent 4/7/2022    #Pneumoperitoneum   -  CT Abdomen and Pelvis w/ Oral Cont (04.11.22 @ 05:26): When compared to examination performed 6-7 hours prior: No definitive extraluminal contrast extravasation was identified to localize/confirm a site of bowel perforation. Additional interval changes as above.    #Abx allergy: NKDA      RECOMMENDATIONS  - Leukocytosis possibly reactive to pneumoperitoneum/pancreatic cancer -- abdominal exam is benign -- low suspicion of developing abscess  - add fluconazole 400 mg x 1, followed by 200 mg daily given suspected upper GI perforation   - continue zosyn   - trend WBC    Please call or message on [employer] Teams if with any questions.  Spectra [phone]

## 2022-04-14 NOTE — CHART NOTE - NSCHARTNOTEFT_GEN_A_CORE
Spouse was at bedside.  Patient/family discussed that patient will be discharged to daughter's home.  Patient will have port inserted and begin chemotherapy outpatient.  Support rendered.

## 2022-04-14 NOTE — PHYSICAL THERAPY INITIAL EVALUATION ADULT - ADDITIONAL COMMENTS
pt lives with her  in a private house with 1 step to the front door and 4+10+3+8 steps to the bedroom and bathroom area with 1 rail on the side.  Pt was able to amb with I without AD untill last week when she started to lose strength very fast and required help from her  for walking and stairs

## 2022-04-14 NOTE — PHYSICAL THERAPY INITIAL EVALUATION ADULT - GENERAL OBSERVATIONS, REHAB EVAL
8;30-9;00 pt was seen for PT IE at bed side, pt is agreeable, chart thoroughly reviewed, Rn aware. pt was received semi peralta in bed, in no apparent distress, +IV, +aparicio, +call bell within reach, bed side table at reach, son at bed side.  pt was left sitting in bed side chair, in no apparent distress, +IV locked for amb, +aparicio, +call bell within reach, bed side table at reach, son at bed side, Rn aware

## 2022-04-14 NOTE — PHYSICAL THERAPY INITIAL EVALUATION ADULT - LONG TERM MEMORY, REHAB EVAL
DATE:  04/18/2019

 

Mrs. Cordoba is seen this morning on her bedside.  She is sitting in the chair and

is feeling better.  She is hoping to go home tomorrow.  Her dyspnea and leg edema

has improved significantly with diuresis over the last few days.  She also

received a blood transfusion yesterday for due to anemia.  She denies any nausea

or vomiting.

 

PHYSICAL EXAMINATION:

Temperature 98.0 degrees Fahrenheit, heart rate 112 per minute and respiratory

rate 20 per minute.  Blood pressure 142/75 mmHg and oxygen saturation 92%.

Intake and output records from yesterday showed total intake 1200 and output 2225

with a negative fluid balance of 1025 mL.  Her weight is down to 68.3 kg.

Lungs have slightly diminished breath sounds.

Heart sounds are irregular and tachycardiac.

Neck is supple and jugular venous distention (JVD) is not elevated.

Abdomen soft and benign and bowel sounds are normal.

Extremities have no cyanosis or clubbing.  Lower extremity edema has improved

significantly.

 

Today's labs show WBC count 7.5, hemoglobin 10.5 and hematocrit 31.5.  Platelets

275.  Sodium 135, potassium 4.6, CO2 27, BUN 27 and creatinine 1.0.  Glucose 129

and calcium 8.1.

 

PROBLEMS:

1.  Acute renal failure superimposed on chronic kidney disease.  Her kidney

function has improved and this is most likely about her baseline function.  Her

electrolytes are stable.

 

2.  Congestive heart failure.  Volume status is also improved significantly with

negative fluid balance for the last few days.  She should remain on a maintenance

dose of oral diuretic.

 

3.  Hyponatremia.  Sodium level has improved and almost normal now.  Most likely

her hyponatremia was related to congestive heart failure.

 

4.  Anemia.  Her anemia has also improved with transfusion.

 

DISPOSITION:

From a renal standpoint, the patient is doing very well now and I am signing off

her case. not tested

## 2022-04-14 NOTE — PROGRESS NOTE ADULT - SUBJECTIVE AND OBJECTIVE BOX
Patient is a 62y old  Female who presents with a chief complaint of Pancreatic ca and abd pain (13 Apr 2022 10:20)       Pt is seen and examined  pt is awake and out of bed to chair  pt seems comfortable and denies any complaints at this time          ROS:  Negative except for:generalized weakness/DAVID    MEDICATIONS  (STANDING):  latanoprost 0.005% Ophthalmic Solution 1 Drop(s) Both EYES at bedtime  pantoprazole  Injectable 40 milliGRAM(s) IV Push every 12 hours  piperacillin/tazobactam IVPB.. 3.375 Gram(s) IV Intermittent every 12 hours  sodium chloride 0.9%. 1000 milliLiter(s) (100 mL/Hr) IV Continuous <Continuous>    MEDICATIONS  (PRN):  HYDROmorphone  Injectable 1 milliGRAM(s) IV Push every 4 hours PRN Moderate Pain (4 - 6)  HYDROmorphone  Injectable 2 milliGRAM(s) IV Push every 6 hours PRN Severe Pain (7 - 10)  ondansetron Injectable 4 milliGRAM(s) IV Push every 6 hours PRN Nausea  temazepam 15 milliGRAM(s) Oral at bedtime PRN Insomnia      Allergies    No Known Allergies    Intolerances        Vital Signs Last 24 Hrs  T(C): 35.7 (14 Apr 2022 05:07), Max: 37 (13 Apr 2022 21:01)  T(F): 96.3 (14 Apr 2022 05:07), Max: 98.6 (13 Apr 2022 21:01)  HR: 91 (14 Apr 2022 05:07) (90 - 92)  BP: 129/60 (14 Apr 2022 05:07) (129/60 - 156/88)  BP(mean): --  RR: 16 (14 Apr 2022 05:07) (16 - 16)  SpO2: --    PHYSICAL EXAM  General: cachectic  HEENT: clear oropharynx, anicteric sclera, pink conjunctiva  Neck: supple  CV: normal S1/S2 with no murmur rubs or gallops  Lungs: positive air movement b/l ant lungs,clear to auscultation, no wheezes, no rales  Abdomen: soft non-tender non-distended, no hepatosplenomegaly  Ext: no clubbing cyanosis or edema  Skin: no rashes and no petechiae  Neuro: alert and oriented X 4, no focal deficits  LABS:                          10.9   19.91 )-----------( 27       ( 14 Apr 2022 08:56 )             33.8         Mean Cell Volume : 94.4 fL  Mean Cell Hemoglobin : 30.4 pg  Mean Cell Hemoglobin Concentration : 32.2 g/dL  Auto Neutrophil # : x  Auto Lymphocyte # : x  Auto Monocyte # : x  Auto Eosinophil # : x  Auto Basophil # : x  Auto Neutrophil % : x  Auto Lymphocyte % : x  Auto Monocyte % : x  Auto Eosinophil % : x  Auto Basophil % : x    Serial CBC's  04-14 @ 08:56  Hct-33.8 / Hgb-10.9 / Plat-27 / RBC-3.58 / WBC-19.91          Serial CBC's  04-13 @ 13:56  Hct-33.4 / Hgb-10.9 / Plat-43 / RBC-3.56 / WBC-13.68          Serial CBC's  04-13 @ 06:38  Hct--- / Hgb--- / Plat--- / RBC-3.58 / WBC---          Serial CBC's  04-12 @ 08:12  Hct-32.6 / Hgb-10.7 / Plat-53 / RBC-3.52 / WBC-12.02          Serial CBC's  04-11 @ 15:41  Hct-34.7 / Hgb-11.4 / Plat-75 / RBC-3.77 / WBC-11.36            04-14    151<H>  |  118<H>  |  18  ----------------------------<  137<H>  3.4<L>   |  21  |  0.6<L>    Ca    7.9<L>      14 Apr 2022 08:56  Phos  2.6     04-14  Mg     1.6     04-14        PT/INR - ( 14 Apr 2022 08:56 )   PT: 20.10 sec;   INR: 1.76 ratio         PTT - ( 14 Apr 2022 08:56 )  PTT:26.0 sec    WBC Count: 19.91 K/uL (04-14-22 @ 08:56)  Hemoglobin: 10.9 g/dL (04-14-22 @ 08:56)  Hematocrit: 33.8 % (04-14-22 @ 08:56)  Platelet Count - Automated: 27 K/uL (04-14-22 @ 08:56)  WBC Count: 13.68 K/uL (04-13-22 @ 13:56)  Hemoglobin: 10.9 g/dL (04-13-22 @ 13:56)  Hematocrit: 33.4 % (04-13-22 @ 13:56)  Platelet Count - Automated: 43 K/uL (04-13-22 @ 13:56)  Reticulocyte Percent: 1.7 % (04-13-22 @ 06:38)  Iron - Total Binding Capacity.: 159 ug/dL (04-13-22 @ 06:38)  Ferritin, Serum: 914 ng/mL (04-13-22 @ 06:38)  WBC Count: 12.02 K/uL (04-12-22 @ 08:12)  Hemoglobin: 10.7 g/dL (04-12-22 @ 08:12)  Hematocrit: 32.6 % (04-12-22 @ 08:12)  Platelet Count - Automated: 53 K/uL (04-12-22 @ 08:12)  WBC Count: 11.36 K/uL (04-11-22 @ 15:41)  Hemoglobin: 11.4 g/dL (04-11-22 @ 15:41)  Hematocrit: 34.7 % (04-11-22 @ 15:41)  Platelet Count - Automated: 75 K/uL (04-11-22 @ 15:41)  Hemoglobin: 12.6 g/dL (04-10-22 @ 19:45)  Hematocrit: 39.6 % (04-10-22 @ 19:45)  Platelet Count - Automated: 124 K/uL (04-10-22 @ 19:45)  WBC Count: 10.86 K/uL (04-10-22 @ 19:45)                BLOOD SMEAR INTERPRETATION:       RADIOLOGY & ADDITIONAL STUDIES:

## 2022-04-15 NOTE — PROGRESS NOTE ADULT - SUBJECTIVE AND OBJECTIVE BOX
Patient is a 62y old  Female who presents with a chief complaint of Pancreatic cancer (14 Apr 2022 16:20)       Pt is seen and examined  pt is awake and lying in bed        ROS:  Negative except for:generalized weakness    MEDICATIONS  (STANDING):  dextrose 5% + sodium chloride 0.45%. 1000 milliLiter(s) (50 mL/Hr) IV Continuous <Continuous>  latanoprost 0.005% Ophthalmic Solution 1 Drop(s) Both EYES at bedtime  pantoprazole  Injectable 40 milliGRAM(s) IV Push every 12 hours  piperacillin/tazobactam IVPB.. 3.375 Gram(s) IV Intermittent every 12 hours    MEDICATIONS  (PRN):  HYDROmorphone  Injectable 1 milliGRAM(s) IV Push every 4 hours PRN Moderate Pain (4 - 6)  HYDROmorphone  Injectable 2 milliGRAM(s) IV Push every 6 hours PRN Severe Pain (7 - 10)  ondansetron Injectable 4 milliGRAM(s) IV Push every 6 hours PRN Nausea  temazepam 15 milliGRAM(s) Oral at bedtime PRN Insomnia      Allergies    No Known Allergies    Intolerances        Vital Signs Last 24 Hrs  T(C): 36.8 (15 Apr 2022 14:05), Max: 36.8 (15 Apr 2022 14:05)  T(F): 98.2 (15 Apr 2022 14:05), Max: 98.2 (15 Apr 2022 14:05)  HR: 100 (15 Apr 2022 14:05) (97 - 100)  BP: 139/63 (15 Apr 2022 14:05) (119/60 - 139/63)  BP(mean): --  RR: 16 (15 Apr 2022 14:05) (16 - 16)  SpO2: --    PHYSICAL EXAM  General: lethargic  HEENT: clear oropharynx, anicteric sclera, pink conjunctiva  Neck: supple  CV: normal S1/S2 with no murmur rubs or gallops  Lungs: positive air movement b/l ant lungs,clear to auscultation, no wheezes, no rales  Abdomen: soft non-tender non-distended, no hepatosplenomegaly  Ext: no clubbing cyanosis or edema  Skin: no rashes and no petechiae  Neuro: alert and oriented X 4, no focal deficits  LABS:                          11.3   23.80 )-----------( 23       ( 15 Apr 2022 08:42 )             34.9         Mean Cell Volume : 94.8 fL  Mean Cell Hemoglobin : 30.7 pg  Mean Cell Hemoglobin Concentration : 32.4 g/dL  Auto Neutrophil # : 21.43 K/uL  Auto Lymphocyte # : 1.07 K/uL  Auto Monocyte # : 0.70 K/uL  Auto Eosinophil # : 0.04 K/uL  Auto Basophil # : 0.07 K/uL  Auto Neutrophil % : 90.0 %  Auto Lymphocyte % : 4.5 %  Auto Monocyte % : 2.9 %  Auto Eosinophil % : 0.2 %  Auto Basophil % : 0.3 %    Serial CBC's  04-15 @ 08:42  Hct-34.9 / Hgb-11.3 / Plat-23 / RBC-3.68 / WBC-23.80          Serial CBC's  04-14 @ 08:56  Hct-33.8 / Hgb-10.9 / Plat-27 / RBC-3.58 / WBC-19.91          Serial CBC's  04-13 @ 13:56  Hct-33.4 / Hgb-10.9 / Plat-43 / RBC-3.56 / WBC-13.68          Serial CBC's  04-13 @ 06:38  Hct--- / Hgb--- / Plat--- / RBC-3.58 / WBC---          Serial CBC's  04-12 @ 08:12  Hct-32.6 / Hgb-10.7 / Plat-53 / RBC-3.52 / WBC-12.02            04-15    148<H>  |  117<H>  |  11  ----------------------------<  154<H>  3.0<L>   |  18  |  0.5<L>    Ca    7.8<L>      15 Apr 2022 08:42  Phos  2.6     04-14  Mg     1.8     04-15    TPro  5.2<L>  /  Alb  2.6<L>  /  TBili  1.8<H>  /  DBili  x   /  AST  67<H>  /  ALT  69<H>  /  AlkPhos  157<H>  04-15      PT/INR - ( 14 Apr 2022 08:56 )   PT: 20.10 sec;   INR: 1.76 ratio         PTT - ( 14 Apr 2022 08:56 )  PTT:26.0 sec    WBC Count: 23.80 K/uL (04-15-22 @ 08:42)  Hemoglobin: 11.3 g/dL (04-15-22 @ 08:42)  Hematocrit: 34.9 % (04-15-22 @ 08:42)  Platelet Count - Automated: 23 K/uL (04-15-22 @ 08:42)  WBC Count: 19.91 K/uL (04-14-22 @ 08:56)  Hemoglobin: 10.9 g/dL (04-14-22 @ 08:56)  Hematocrit: 33.8 % (04-14-22 @ 08:56)  Platelet Count - Automated: 27 K/uL (04-14-22 @ 08:56)  WBC Count: 13.68 K/uL (04-13-22 @ 13:56)  Hemoglobin: 10.9 g/dL (04-13-22 @ 13:56)  Hematocrit: 33.4 % (04-13-22 @ 13:56)  Platelet Count - Automated: 43 K/uL (04-13-22 @ 13:56)  Reticulocyte Percent: 1.7 % (04-13-22 @ 06:38)  Iron - Total Binding Capacity.: 159 ug/dL (04-13-22 @ 06:38)  Ferritin, Serum: 914 ng/mL (04-13-22 @ 06:38)  WBC Count: 12.02 K/uL (04-12-22 @ 08:12)  Hemoglobin: 10.7 g/dL (04-12-22 @ 08:12)  Hematocrit: 32.6 % (04-12-22 @ 08:12)  Platelet Count - Automated: 53 K/uL (04-12-22 @ 08:12)  WBC Count: 11.36 K/uL (04-11-22 @ 15:41)  Hemoglobin: 11.4 g/dL (04-11-22 @ 15:41)  Hematocrit: 34.7 % (04-11-22 @ 15:41)  Platelet Count - Automated: 75 K/uL (04-11-22 @ 15:41)  Hemoglobin: 12.6 g/dL (04-10-22 @ 19:45)  Hematocrit: 39.6 % (04-10-22 @ 19:45)  Platelet Count - Automated: 124 K/uL (04-10-22 @ 19:45)  WBC Count: 10.86 K/uL (04-10-22 @ 19:45)                BLOOD SMEAR INTERPRETATION:       RADIOLOGY & ADDITIONAL STUDIES:

## 2022-04-15 NOTE — CHART NOTE - NSCHARTNOTEFT_GEN_A_CORE
PALLIATIVE MEDICINE INTERDISCIPLINARY TEAM NOTE    Provider:  [   ]Social Work   [   ]          [   ] Initial visit [   ] Follow up    Family or contact name / phone #   Met with: [   ] Patient  [   ] Family  [   ] Other:    Primary Language: [   ] English [   ] Other*:                      *Interpretation provided by:    SUPPORT DIAGNOSES            (Check all that apply)  [   ] Psychosocial spiritual assessment (PSSA)  [   ] EOL issues  [   ] Cultural / spiritual concerns  [   ] Pain / suffering  [   ] Dementia / AMS  [   ] Other:  [   ] AD issues  [   ] Grief / loss / sadness  [   ] Discharge issues  [   ] Distress / coping    PSYCHOSOCIAL ASSESSMENT OF PATIENT         (Check all that apply)  [   ] Initial Assessment            [   ] Reassessment          [   ] Not Applicable this visit    Pain/suffering acuity:  [   ] None to mild (0-3)           [   ] Moderate (4-6)        [   ] High (7-10)    Mental Status:  [   ] Alert/oriented (x3)          [   ] Confused/Altered(x2/x1)         [   ] Non-resp    Functional status:  [   ] Independent w ADLs      [   ] Needs Assistance             [   ] Bedbound/Full Care    Coping:  [   ] Coping well                     [   ] Coping w/difficulty            [   ] Poor coping    Support system:  [   ] Strong                              [   ] Adequate                        [   ] Inadequate    SPIRITUAL ASSESSMENT  [employer]ian/Spiritual practice: _____Catholic______________________    Role of organized Oriental orthodox:  [ x  ] Important                     [   ] Some (fam tradition, cultural)               [   ] None    Effects on medical care:  [   ] Yes, _____________________________________                         [ x  ] None    Cultural/Jewish need:  [   ] Yes, _____________________________________                         [ x  ] None    Refer to Pastoral Care:  [   ] Yes           [ x  ] No, not at this time    SERVICE PROVIDED  [   ]PSSA                                                                             [   ]Discharge support / facilitation  [   ]AD / goals of care counseling                                  [   ]EOL / death / bereavement counseling  [   ]Counseling / support                                                [   ] Family meeting  [ x  ]Prayer / sacrament / ritual                                      [   ] Referral   [   ]Other                                                                       NOTE and Plan of Care (PoC): Pt states she's comfortable. a few family members were present at time of visit. Pt seems to be under a lot of pressure from family members to get well which I suspect to be overwhelming for Pt. I will follow up as needed.

## 2022-04-15 NOTE — CHART NOTE - NSCHARTNOTEFT_GEN_A_CORE
discuss with hospitalist dr. brock     will accept pt to medicine discuss with hospitalist dr. brock     will accept pt to medicine      will replace K/ MAG   and order labs in am

## 2022-04-15 NOTE — CONSULT NOTE ADULT - CONSULT REASON
Cardiovascular Risk Stratification
Leukocytosis
Second opinion
comanagement and possible transfer
pancreatic cancer
pancreatic cancer
comanagement
Pancreatic cancer.
GOC

## 2022-04-15 NOTE — PROGRESS NOTE ADULT - SUBJECTIVE AND OBJECTIVE BOX
DILIP ROBERTS  62y, Female  Allergy: No Known Allergies      LOS  4d    CHIEF COMPLAINT: Pancreatic cancer (14 Apr 2022 16:20)      INTERVAL EVENTS/HPI  - No acute events overnight  - T(F): , Max: 98.4 (04-14-22 @ 14:24)  - denies any abdominal pain, nausea, vomiting   - WBC Count: 23.80 (04-15-22 @ 08:42)  WBC Count: 19.91 (04-14-22 @ 08:56)     - Creatinine, Serum: 0.5 (04-15-22 @ 08:42)  Creatinine, Serum: 0.6 (04-14-22 @ 08:56)       ROS  General: Denies rigors, nightsweats  HEENT: Denies headache, rhinorrhea, sore throat, eye pain  CV: Denies CP, palpitations  PULM: Denies wheezing, hemoptysis  GI: Denies hematemesis, hematochezia, melena  : Denies discharge, hematuria  MSK: Denies arthralgias, myalgias  SKIN: Denies rash, lesions  NEURO: Denies paresthesias, weakness  PSYCH: Denies depression, anxiety    VITALS:  T(F): 97.4, Max: 98.4 (04-14-22 @ 14:24)  HR: 99  BP: 122/53  RR: 16Vital Signs Last 24 Hrs  T(C): 36.3 (15 Apr 2022 05:46), Max: 36.9 (14 Apr 2022 14:24)  T(F): 97.4 (15 Apr 2022 05:46), Max: 98.4 (14 Apr 2022 14:24)  HR: 99 (15 Apr 2022 05:46) (95 - 99)  BP: 122/53 (15 Apr 2022 05:46) (119/60 - 134/78)  BP(mean): --  RR: 16 (15 Apr 2022 05:46) (16 - 16)  SpO2: --    PHYSICAL EXAM:  Gen: NAD, resting in bed  HEENT: Normocephalic, atraumatic  Neck: supple, no lymphadenopathy  CV: Regular rate & regular rhythm  Lungs: decreased BS at bases, no fremitus  Abdomen: Soft, BS present  Ext: Warm, well perfused  Neuro: non focal, awake  Skin: no rash, no erythema  Lines: no phlebitis    FH: Non-contributory  Social Hx: Non-contributory    TESTS & MEASUREMENTS:                        11.3   23.80 )-----------( 23       ( 15 Apr 2022 08:42 )             34.9     04-15    148<H>  |  117<H>  |  11  ----------------------------<  154<H>  3.0<L>   |  18  |  0.5<L>    Ca    7.8<L>      15 Apr 2022 08:42  Phos  2.6     04-14  Mg     1.8     04-15    TPro  5.2<L>  /  Alb  2.6<L>  /  TBili  1.8<H>  /  DBili  x   /  AST  67<H>  /  ALT  69<H>  /  AlkPhos  157<H>  04-15      LIVER FUNCTIONS - ( 15 Apr 2022 08:42 )  Alb: 2.6 g/dL / Pro: 5.2 g/dL / ALK PHOS: 157 U/L / ALT: 69 U/L / AST: 67 U/L / GGT: x               Culture - Blood (collected 04-10-22 @ 21:45)  Source: .Blood Blood-Peripheral  Preliminary Report (04-12-22 @ 20:01):    No growth to date.    Culture - Blood (collected 04-10-22 @ 21:45)  Source: .Blood Blood-Peripheral  Preliminary Report (04-12-22 @ 20:01):    No growth to date.        Lactate, Blood: 1.1 mmol/L (04-11-22 @ 15:45)  Lactate, Blood: 1.8 mmol/L (04-11-22 @ 02:15)  Blood Gas Venous - Lactate: 3.90 mmol/L (04-10-22 @ 20:23)  Blood Gas Venous - Lactate: 4.70 mmol/L (04-10-22 @ 18:32)      INFECTIOUS DISEASES TESTING  COVID-19 PCR: NotDetec (04-10-22 @ 20:25)      INFLAMMATORY MARKERS      RADIOLOGY & ADDITIONAL TESTS:  I have personally reviewed the last available Chest xray  CXR      CT  CT Chest w/ IV Cont:   ACC: [id number] EXAM:  CT CHEST IC                          PROCEDURE DATE:  04/14/2022          INTERPRETATION:  CLINICAL HISTORY / REASON FOR EXAM: Pancreatic cancer.   Evaluation metastatic disease.    TECHNIQUE: CT of the chest was performed fromthe thoracic inlet to the   level of the adrenal glands following the administration of intravenous   contrast. Coronal and sagittal images have been submitted, as well as MIP   reformats.    CORRELATION: CT abdomen pelvis 4/10/2022 and 4/11/2022.    FINDINGS:    LUNGS, PLEURA, AND AIRWAYS: Patent central tracheobronchial tree.   Centrilobular predominant emphysematous changes. Small right greater than   left pleural effusions with atelectasis. No pneumothorax. No suspicious   nodules or masses of the visualized lung.    MEDIASTINUM/LYMPH NODES: Secretions visualized in the mid esophagus. No   mediastinal, hilar, or axillary lymphadenopathy by size criteria.    HEART/GREAT VESSELS: Normal heart size. No pericardial effusion. Normal   caliber main pulmonary artery. Normal caliber thoracic aorta. Aortic   calcifications.    VISUALIZED UPPER ABDOMEN: Decreased pneumoperitoneum of the visualized   upper abdomen. Increased upper abdominal free fluid. Unchanged appearance   of the solid organs,described in detail on prior examinations 4/10/22   and 4/11/2022. Partially visualized pneumobilia and common bile duct   stent.    BONES AND SOFT TISSUES: No aggressive osseous lesions.      IMPRESSION:    Small pleural effusions with adjacent atelectasis. Underlying pneumonia   not excluded.    No suspicious nodules or masses of the aerated lung.    Increased upper abdominal free fluid, pneumobilia and decreased   pneumoperitoneum of the partially visualized upper abdomen, better   characterized on recent CT abdomen and pelvis from April 11, 2022.    --- End of Report ---          ROB VERA MD; Resident Radiologist  This document has been electronically signed.  NISREEN PELLETIER MD; Attending Radiologist  This document has been electronically signed. Apr 15 2022 10:08AM (04-14-22 @ 17:24)      CARDIOLOGY TESTING  12 Lead ECG:   Ventricular Rate 85 BPM    Atrial Rate 85 BPM    P-R Interval 118 ms    QRS Duration 70 ms    Q-T Interval 508 ms    QTC Calculation(Bazett) 604 ms    P Axis 82 degrees    R Axis 71 degrees    T Axis 55 degrees    Diagnosis Line Normal sinus rhythm  Possible Left atrial enlargement  Nonspecific ST and T wave abnormality  Abnormal ECG    Confirmed by STEPHANIE BLOCK MD ([id number]) on 4/11/2022 12:21:38 PM (04-10-22 @ 19:18)      MEDICATIONS  dextrose 5% + sodium chloride 0.45%. 1000 IV Continuous <Continuous>  latanoprost 0.005% Ophthalmic Solution 1 Both EYES at bedtime  magnesium sulfate  IVPB 2 IV Intermittent once  pantoprazole  Injectable 40 IV Push every 12 hours  piperacillin/tazobactam IVPB.. 3.375 IV Intermittent every 12 hours  potassium chloride  20 mEq/100 mL IVPB 20 IV Intermittent every 2 hours      WEIGHT  Weight (kg): 41.6 (04-11-22 @ 05:25)  Creatinine, Serum: 0.5 mg/dL (04-15-22 @ 08:42)      ANTIBIOTICS:  piperacillin/tazobactam IVPB.. 3.375 Gram(s) IV Intermittent every 12 hours      All available historical records have been reviewed

## 2022-04-15 NOTE — CONSULT NOTE ADULT - PROVIDER SPECIALTY LIST ADULT
Infectious Disease
Hospitalist
Palliative Care
Surgery
Cardiology
Gastroenterology
Heme/Onc
Hospitalist
Heme/Onc

## 2022-04-15 NOTE — PROGRESS NOTE ADULT - ATTENDING COMMENTS
above noted discussed case with surgical resident and pt family and pt abdomen soft no distension/tenderness abnormal labs noted for ct scan of chest and bone scan
above noted discussed case with surgical resident and PA abdomen soft no distension/tenderness discussed case with pt and pt family regarding placement of leila catheter on Friday
above noted discussed case with surgical resident, pt, pt family and DR Luther abdomen soft no distension/tenderness for leila catheter when stable
above noted discussed case with surgical resident and PA oncology consult noted will place leila catheter when stable

## 2022-04-15 NOTE — CONSULT NOTE ADULT - CONSULT REQUESTED DATE/TIME
14-Apr-2022
11-Apr-2022 17:38
12-Apr-2022
13-Apr-2022
11-Apr-2022 13:34
13-Apr-2022 19:04
15-Apr-2022
14-Apr-2022 16:20
11-Apr-2022 16:40

## 2022-04-15 NOTE — CONSULT NOTE ADULT - SUBJECTIVE AND OBJECTIVE BOX
Sonal Santillan  is a  62  years old female patient with previous medical history of glaucoma,  Pancreatic CANCER- DIAGNOSED  4/4/22, S/P BILIARY STENT PLACEMENT - 4/5/22, S/P PANCREATIC STENT PLACEMENT 4/7/22 -ALL PROCEDURES DONE BY DR JANG  --IR  -AT [facility] presents for eval of [facility]  Medicine consulted for comanagement.     Pt  at bedside   Review of Symptoms:   The patient denies fever, chills, chest pain, cough, shortness of breath, nausea, vomiting, but report that she still having abdominal discomfort no diarrhea or dysuria  On exam:   General: awake, alert, takes time to recall events, not in acute distress, cachectic    HEENT: normocephalic, atraumatic, without obvious abnormality, anicteric   Lungs: clear to auscultations bilaterally, normal work of breathing, no wheezing   Heart: regular rate and rhythm, normal S1 and S2   Abdomen: soft, mild tender, + distended  Extremities: no lower extremity edema bilaterally       Lab work, EKGs, Imaging reviewed and significant for:                                     11.3   23.80 )-----------( 23       ( 15 Apr 2022 08:42 )             34.9   04-15    148<H>  |  117<H>  |  11  ----------------------------<  154<H>  3.0<L>   |  18  |  0.5<L>    Ca    7.8<L>      15 Apr 2022 08:42  Phos  2.6     04-14  Mg     1.8     04-15    TPro  5.2<L>  /  Alb  2.6<L>  /  TBili  1.8<H>  /  DBili  x   /  AST  67<H>  /  ALT  69<H>  /  AlkPhos  157<H>  04-15      CT abdomen with Pneumoperitoneum. In the absence of recent surgical intervention, findings presumably reflecting perforation -suspect either an upper GI perforation or the possibility of biliary perforation given recent stent placement and air within the biliary tree. Free fluid predominantly along the right greater than left abdomen/paracolic gutters. With partial surrounding enhancement on the right. Findings may reflect partially loculated fluid/developing abscess or the possibility of peritoneal enhancement/peritonitis. Free fluid may reflect bile leak if biliary perforation. Pancreatic mass compatible with provided history of pancreatic cancer. Multiple hypovascular liver masses, presumably metastatic lesions.   Descending colonic wall thickening, may be reactive or reflect colitis.  Fluid-filled and distended stomach and proximal duodenum may reflect associated ileus/partial obstruction with fluid extending into the distal esophagus which may place patient at increased risk for aspiration.      Assessment and Recommendation: 	  []Newly diagnosed Metastatic Pancreatic CA,  s/p stent placement at [facility] on 4/7  []Abdominal CT with  Pneumoperitoneum and possible perforation, free fluids , possible peritonitis  , liver mets , possible colitis, possible ileus and partial obstruction   []Leukocytosis, thrombocytopenia   []Hypernatremia, hyperchloremia,  hypokalemia, hypomagnesemia   []Severe protein-calorie malnutrition     -Please replete K and magnesium and monitor   -please consult nephrology for the kidney function and the hypernatremia   - check urine lytes, urine and serum osmolality   -hypernatremia and hyperchloremia mildly improving after NS switching to D5, 1/2 NS    monitor electrolyte frequently and kidney function    palliative  hem/onc and ID on board   monitor for bleeding , keep type and screen active, r/o HIT as per Hem/onc and r/o DIC   Avoid medications that cause thrombocytopenia transfuse  to keep plt >15 or any s/s of bleeding  as per hem   f/u CT chest and bone scan   rest of management as per primary team     Patient can be transferred to medicine if no surgical procedure as per surgery     Discussed with primary team               Thank you for Allowing us to participate in the care of Sonal Santillan    Please call [phone] with any question     Domenic Lemon MD

## 2022-04-15 NOTE — PROGRESS NOTE ADULT - SUBJECTIVE AND OBJECTIVE BOX
GENERAL SURGERY PROGRESS NOTE    Patient: DILIP ROBERTS , 62y ([birth year])Female   MRN: [id number]  Location: [place] [address]  Visit: 04-11-22 Inpatient  Date: 04-15-22 @ 09:34    Hospital Day #: 5    Procedure/Dx/Injuries: Metastatic Pancreatic CA, Pneumoperitoneum likely 2/2 to instrumentation from PTC/Stent placement    Events of past 24 hours:  No acute events, VSS, tolerating clear liquids    PAST MEDICAL & SURGICAL HISTORY:  Glaucoma    Pancreatic cancer        Vitals:   T(F): 97.4 (04-15-22 @ 05:46), Max: 98.4 (04-14-22 @ 14:24)  HR: 99 (04-15-22 @ 05:46)  BP: 122/53 (04-15-22 @ 05:46)  RR: 16 (04-15-22 @ 05:46)  SpO2: --      Diet, Clear Liquid:   Prosource Gelatein Plus     Qty per Day:  3x  Supplement Feeding Modality:  Oral  Ensure Clear Cans or Servings Per Day:  3       Frequency:  Daily      Fluids: dextrose 5% + sodium chloride 0.45%.: Solution, 1000 milliLiter(s) infuse at 50 mL/Hr  Provider's Contact #: [phone]      I & O's:    04-14-22 @ 07:01  -  04-15-22 @ 07:00  --------------------------------------------------------  IN:  Total IN: 0 mL    OUT:    Indwelling Catheter - Urethral (mL): 750 mL  Total OUT: 750 mL    Total NET: -750 mL          PHYSICAL EXAM:  General: NAD, AAOx3, calm and cooperative  HEENT: NCAT, KHURRAM, EOMI, Trachea ML, Neck supple  Cardiac: RRR S1, S2, no Murmurs, rubs or gallops  Respiratory: CTAB, normal respiratory effort, breath sounds equal BL, no wheeze, rhonchi or crackles  Abdomen: Soft, non-distended, non-tender, no rebound, no guarding. +BS.  Musculoskeletal: Strength 5/5 BL UE/LE, ROM intact, compartments soft  Neuro: Sensation grossly intact and equal throughout, no focal deficits  Vascular: Pulses 2+ throughout, extremities well perfused  Skin: Warm/dry, normal color, no jaundice    MEDICATIONS  (STANDING):  dextrose 5% + sodium chloride 0.45%. 1000 milliLiter(s) (50 mL/Hr) IV Continuous <Continuous>  latanoprost 0.005% Ophthalmic Solution 1 Drop(s) Both EYES at bedtime  pantoprazole  Injectable 40 milliGRAM(s) IV Push every 12 hours  piperacillin/tazobactam IVPB.. 3.375 Gram(s) IV Intermittent every 12 hours    MEDICATIONS  (PRN):  HYDROmorphone  Injectable 1 milliGRAM(s) IV Push every 4 hours PRN Moderate Pain (4 - 6)  HYDROmorphone  Injectable 2 milliGRAM(s) IV Push every 6 hours PRN Severe Pain (7 - 10)  ondansetron Injectable 4 milliGRAM(s) IV Push every 6 hours PRN Nausea  temazepam 15 milliGRAM(s) Oral at bedtime PRN Insomnia      DVT PROPHYLAXIS:   GI PROPHYLAXIS: pantoprazole  Injectable 40 milliGRAM(s) IV Push every 12 hours    ANTICOAGULATION:   ANTIBIOTICS:  piperacillin/tazobactam IVPB.. 3.375 Gram(s)            LAB/STUDIES:  Labs:  CAPILLARY BLOOD GLUCOSE                              10.9   19.91 )-----------( 27       ( 14 Apr 2022 08:56 )             33.8         04-14    151<H>  |  118<H>  |  18  ----------------------------<  137<H>  3.4<L>   |  21  |  0.6<L>          LFTs:         Coags:     20.10  ----< 1.76    ( 14 Apr 2022 08:56 )     26.0          ACCESS/ DEVICES:  [ x] Peripheral IV  [ ] Central Venous Line	[ ] R	[ ] L	[ ] IJ	[ ] Fem	[ ] SC	Placed:   [ ] Arterial Line		[ ] R	[ ] L	[ ] Fem	[ ] Rad	[ ] Ax	Placed:   [ ] PICC:					[ ] Mediport  [ ] Urinary Catheter,  Date Placed:   [ ] Chest tube: [ ] Right, [ ] Left  [ ] BHUMIKA/Sonido Drains

## 2022-04-16 NOTE — PROGRESS NOTE ADULT - SUBJECTIVE AND OBJECTIVE BOX
Patient is a 62y old  Female who presents with a chief complaint of Pancreatic cancer (14 Apr 2022 16:20)       Pt is seen and examined  pt is awake and lying in bed in distress  family at bed side         ROS:  Negative except for:generalized weakness    MEDICATIONS  (STANDING):  dextrose 5% + sodium chloride 0.45%. 1000 milliLiter(s) (50 mL/Hr) IV Continuous <Continuous>  latanoprost 0.005% Ophthalmic Solution 1 Drop(s) Both EYES at bedtime  pantoprazole  Injectable 40 milliGRAM(s) IV Push every 12 hours  piperacillin/tazobactam IVPB.. 3.375 Gram(s) IV Intermittent every 12 hours    MEDICATIONS  (PRN):  HYDROmorphone  Injectable 1 milliGRAM(s) IV Push every 4 hours PRN Moderate Pain (4 - 6)  HYDROmorphone  Injectable 2 milliGRAM(s) IV Push every 6 hours PRN Severe Pain (7 - 10)  ondansetron Injectable 4 milliGRAM(s) IV Push every 6 hours PRN Nausea  temazepam 15 milliGRAM(s) Oral at bedtime PRN Insomnia      Allergies    No Known Allergies    Intolerances        Vital Signs Last 24 Hrs  VSS    PHYSICAL EXAM  General: lethargic, getting face mask  HEENT: clear oropharynx, anicteric sclera, pink conjunctiva  Neck: supple  CV: normal S1/S2 with no murmur rubs or gallops  Lungs: positive air movement b/l ant lungs,clear to auscultation, no wheezes, no rales  Abdomen: soft non-tender non-distended, no hepatosplenomegaly  Ext: no clubbing cyanosis or edema  Skin: no rashes and no petechiae  Neuro: alert and oriented X 4, no focal deficits  LABS:                          11.3   23.80 )-----------( 23       ( 15 Apr 2022 08:42 )             34.9         Mean Cell Volume : 94.8 fL  Mean Cell Hemoglobin : 30.7 pg  Mean Cell Hemoglobin Concentration : 32.4 g/dL  Auto Neutrophil # : 21.43 K/uL  Auto Lymphocyte # : 1.07 K/uL  Auto Monocyte # : 0.70 K/uL  Auto Eosinophil # : 0.04 K/uL  Auto Basophil # : 0.07 K/uL  Auto Neutrophil % : 90.0 %  Auto Lymphocyte % : 4.5 %  Auto Monocyte % : 2.9 %  Auto Eosinophil % : 0.2 %  Auto Basophil % : 0.3 %    Serial CBC's  04-15 @ 08:42  Hct-34.9 / Hgb-11.3 / Plat-23 / RBC-3.68 / WBC-23.80          Serial CBC's  04-14 @ 08:56  Hct-33.8 / Hgb-10.9 / Plat-27 / RBC-3.58 / WBC-19.91          Serial CBC's  04-13 @ 13:56  Hct-33.4 / Hgb-10.9 / Plat-43 / RBC-3.56 / WBC-13.68          Serial CBC's  04-13 @ 06:38  Hct--- / Hgb--- / Plat--- / RBC-3.58 / WBC---          Serial CBC's  04-12 @ 08:12  Hct-32.6 / Hgb-10.7 / Plat-53 / RBC-3.52 / WBC-12.02            04-15    148<H>  |  117<H>  |  11  ----------------------------<  154<H>  3.0<L>   |  18  |  0.5<L>    Ca    7.8<L>      15 Apr 2022 08:42  Phos  2.6     04-14  Mg     1.8     04-15    TPro  5.2<L>  /  Alb  2.6<L>  /  TBili  1.8<H>  /  DBili  x   /  AST  67<H>  /  ALT  69<H>  /  AlkPhos  157<H>  04-15      PT/INR - ( 14 Apr 2022 08:56 )   PT: 20.10 sec;   INR: 1.76 ratio         PTT - ( 14 Apr 2022 08:56 )  PTT:26.0 sec    WBC Count: 23.80 K/uL (04-15-22 @ 08:42)  Hemoglobin: 11.3 g/dL (04-15-22 @ 08:42)  Hematocrit: 34.9 % (04-15-22 @ 08:42)  Platelet Count - Automated: 23 K/uL (04-15-22 @ 08:42)  WBC Count: 19.91 K/uL (04-14-22 @ 08:56)  Hemoglobin: 10.9 g/dL (04-14-22 @ 08:56)  Hematocrit: 33.8 % (04-14-22 @ 08:56)  Platelet Count - Automated: 27 K/uL (04-14-22 @ 08:56)  WBC Count: 13.68 K/uL (04-13-22 @ 13:56)  Hemoglobin: 10.9 g/dL (04-13-22 @ 13:56)  Hematocrit: 33.4 % (04-13-22 @ 13:56)  Platelet Count - Automated: 43 K/uL (04-13-22 @ 13:56)  Reticulocyte Percent: 1.7 % (04-13-22 @ 06:38)  Iron - Total Binding Capacity.: 159 ug/dL (04-13-22 @ 06:38)  Ferritin, Serum: 914 ng/mL (04-13-22 @ 06:38)  WBC Count: 12.02 K/uL (04-12-22 @ 08:12)  Hemoglobin: 10.7 g/dL (04-12-22 @ 08:12)  Hematocrit: 32.6 % (04-12-22 @ 08:12)  Platelet Count - Automated: 53 K/uL (04-12-22 @ 08:12)  WBC Count: 11.36 K/uL (04-11-22 @ 15:41)  Hemoglobin: 11.4 g/dL (04-11-22 @ 15:41)  Hematocrit: 34.7 % (04-11-22 @ 15:41)  Platelet Count - Automated: 75 K/uL (04-11-22 @ 15:41)  Hemoglobin: 12.6 g/dL (04-10-22 @ 19:45)  Hematocrit: 39.6 % (04-10-22 @ 19:45)  Platelet Count - Automated: 124 K/uL (04-10-22 @ 19:45)  WBC Count: 10.86 K/uL (04-10-22 @ 19:45)                BLOOD SMEAR INTERPRETATION:       RADIOLOGY & ADDITIONAL STUDIES:

## 2022-04-16 NOTE — PROGRESS NOTE ADULT - CONVERSATION DETAILS
patient with metastatic pancreatic cancer, poor prognosis , was discussed with Patient and  at bed side Flakito, would like to proceed with comfort measures , DNR/DNI most form was signed

## 2022-04-16 NOTE — CHART NOTE - NSCHARTNOTEFT_GEN_A_CORE
Phlebotomist was unable to obtain blood work this morning.   I obtained CBC and BMP, after which pt's  requested no more blood draws.  Order placed.   D/w Rn and the Attending.

## 2022-04-16 NOTE — CHART NOTE - NSCHARTNOTEFT_GEN_A_CORE
The patient condition is worsening, as per her  - he discussed with her and they wish for DNR/DNI no resuscitation and no intubation - after explaining to them in details all the options, he wants her to go in peace when the time comes. He accepted hospice consult as well. Pt is lethargic.  MOLST form filled and DNR/DNI order placed

## 2022-04-16 NOTE — CHART NOTE - NSCHARTNOTEFT_GEN_A_CORE
Called by RN to assess patient due to change in mental status (confusion) with low pulse ox despite 100% NRB. On my exam she is tachypneic. I called ,  Flakito, who is coming in. After nursing intervention which besides application of NRB mask,  included repositioning, pulse ox did rise to high 90's and patient began answering some simple question. ABG ordered

## 2022-04-16 NOTE — CHART NOTE - NSCHARTNOTESELECT_GEN_ALL_CORE
Event Note
SURGERY NOTE -FOLLOW UP
Event Note
PA Note/Event Note
Palliative Care SW Initial Assessment/Event Note
Palliative Care SW Note/Event Note
surgery note

## 2022-04-16 NOTE — PROGRESS NOTE ADULT - SUBJECTIVE AND OBJECTIVE BOX
DIAGNOSIS:   HOSPITAL DAY #:    STATUS POST:    POST OPERATIVE DAY #:     Vital Signs Last 24 Hrs  T(C): 36.5 (16 Apr 2022 14:54), Max: 36.5 (16 Apr 2022 14:54)  T(F): 97.7 (16 Apr 2022 14:54), Max: 97.7 (16 Apr 2022 14:54)  HR: 95 (16 Apr 2022 14:54) (90 - 104)  BP: 104/50 (16 Apr 2022 14:54) (92/60 - 117/57)  BP(mean): --  RR: 16 (16 Apr 2022 14:54) (16 - 16)  SpO2: 98% (16 Apr 2022 10:01) (98% - 98%)    SUBJECTIVE: Pt seen    Pain: YES  [ ]   NO [ ]   Nausea: [ ] YES [ ] NO           Vomiting: [ ] YES [ ] NO  Flatus: [ ] YES [ ] NO             Bowel Movement: [ ] YES [ ] NO     Void: [ ]YES [ ]No      BHUMIKA DRAINAGE: SIGNIFICANT [ ]   NOT SIGNIFICANT [ ]   NOT APPLICABLE [ ]  YES [ ] NO    General Appearance: Appears well, NAD  Neck: Supple  Chest: Equal expansion bilaterally, equal breath sounds  CV: Pulse regular presently  Abdomen: Soft [x ] YES [ ]NO  DISTENDED [ ] YES [x ] NO TENDERNESS [ ]YES [ ]NO  INCISIONS: HEALING WELL [ ] YES  [ ] NO ERYTHEMA [ ] YES [ ] NO DRAINAGE [ ] YES  [ ] NO  Extremities: Grossly symmetric, CALF TENDERNESS [ ] YES  [ ] NO      LABS:                        12.1   57.43 )-----------( 19       ( 16 Apr 2022 11:00 )             38.8     04-16    148<H>  |  114<H>  |  13  ----------------------------<  109<H>  3.4<L>   |  16<L>  |  0.6<L>    Ca    7.8<L>      16 Apr 2022 11:00  Mg     1.9     04-16    TPro  5.4<L>  /  Alb  2.7<L>  /  TBili  1.7<H>  /  DBili  x   /  AST  114<H>  /  ALT  129<H>  /  AlkPhos  200<H>  04-16            ASSESSMENT: oncology follow up noted wbc level noted     GOOD POST OP COURSE [ ]  YES  [ ] NO  CONDITION IMPROVING  []  YES  [ ]  NO          PLAN:    CONTINUE PRESENT MANAGEMENT  [ ] YES  [ ] NO

## 2022-04-16 NOTE — CHART NOTE - NSCHARTNOTEFT_GEN_A_CORE
Registered Dietitian Follow-Up     Patient Profile Reviewed                           Yes [X]   No []     Nutrition History Previously Obtained        Yes [X]  No []       Pertinent  Information: pt is 62 year old female with hx of glaucoma, pancreatic CA diagnosed on 4/4/22. s/p biliary stent placement 4/5/22, pancreatic stent placement 4/7/22. Presents with weakness and decreased appetite with abd distention. Admitted with possible bowel perf, colitis and pancreatic mass with multiple hypovascular liver masses suggestive of stage IV pancreatic CA with mets. Non-operative management with iV abx 2/2 high risk candidate. 4/16/22 pt became tachypneic and lethargic, CXR--> pulmonary edema s/p IV lasix. Family decided to make pt DNR/DNI, hospice consulted, comfort care measures only.   As per initial RD assessment pt meets criteria for severe PCM.       Diet, Clear Liquid:   Prosource Gelatein Plus     Qty per Day:  3x  Supplement Feeding Modality:  Oral  Ensure Clear Cans or Servings Per Day:  3       Frequency:  Daily (04-12-22 @ 19:21) [Active]       Anthropometrics:  - Ht. 157.5 cm   - Wt.  41.6 kgs   - %wt change  - BMI   - IBW      Pertinent Lab Data:  04-16    148<H>  |  114<H>  |  13  ----------------------------<  109<H>  3.4<L>   |  16<L>  |  0.6<L>    Ca    7.8<L>      16 Apr 2022 11:00  Mg     1.9     04-16    TPro  5.4<L>  /  Alb  2.7<L>  /  TBili  1.7<H>  /  DBili  x   /  AST  114<H>  /  ALT  129<H>  /  AlkPhos  200<H>  04-16                          12.1   57.43 )-----------( 19       ( 16 Apr 2022 11:00 )             38.8        Pertinent Meds:   MEDICATIONS  (STANDING):  latanoprost 0.005% Ophthalmic Solution 1 Drop(s) Both EYES at bedtime  pantoprazole  Injectable 40 milliGRAM(s) IV Push every 12 hours  piperacillin/tazobactam IVPB.. 3.375 Gram(s) IV Intermittent every 12 hours    MEDICATIONS  (PRN):  HYDROmorphone  Injectable 1 milliGRAM(s) IV Push every 4 hours PRN Moderate Pain (4 - 6)  HYDROmorphone  Injectable 2 milliGRAM(s) IV Push every 6 hours PRN Severe Pain (7 - 10)  ondansetron Injectable 4 milliGRAM(s) IV Push every 6 hours PRN Nausea  temazepam 15 milliGRAM(s) Oral at bedtime PRN Insomnia    Physical Findings:  - Appearance: lethargic, family at bedside   - GI function: + BM x1 today   - Tubes: n/a   - Oral/Mouth cavity:  - Skin: intact      Nutrition Requirements  Weight Used: 41.6 kgs      Estimated Energy Needs:   4045-5269 kcals (30-35 kcal/kg/BW) 50-58g protein (1.2-1.4g/kg/BW) 1;1 kcal for estimated fluid needs     Nutrient Intake: poor <50%  2/2 lethargy          [] Previous Nutrition Diagnosis:            [X] Ongoing          [] Resolved    severe PCM      Nutrition Intervention:  maintain on above diet as tolerated by pt      Goal/Expected Outcome: comfort care measures      Indicator/Monitoring: RD to monitor tolerance to po diet, labs/meds, NFPF and f/u as needed within 2-4 days.

## 2022-04-16 NOTE — PROGRESS NOTE ADULT - SUBJECTIVE AND OBJECTIVE BOX
DILIP ROBERTS  62y, Female  Allergy: No Known Allergies    Hospital Day: 5d    Patient seen and examined earlier today.     PMH/PSH:  PAST MEDICAL & SURGICAL HISTORY:  Glaucoma    Pancreatic cancer        LAST 24-Hr EVENTS:    VITALS:  T(F): 97.4 (04-16-22 @ 05:00), Max: 98.2 (04-15-22 @ 14:05)  HR: 90 (04-16-22 @ 06:22)  BP: 117/57 (04-16-22 @ 06:22) (92/60 - 139/63)  RR: 16 (04-16-22 @ 06:22)  SpO2: --          TESTS & MEASUREMENTS:  Weight/BMI      04-14-22 @ 07:01  -  04-15-22 @ 07:00  --------------------------------------------------------  IN: 0 mL / OUT: 750 mL / NET: -750 mL    04-15-22 @ 07:01  -  04-16-22 @ 07:00  --------------------------------------------------------  IN: 0 mL / OUT: 350 mL / NET: -350 mL                            11.3   23.80 )-----------( 23       ( 15 Apr 2022 08:42 )             34.9       INR: 1.76 ratio (04-14-22 @ 08:56)  INR: 1.88 ratio (04-11-22 @ 15:45)    04-15    148<H>  |  117<H>  |  11  ----------------------------<  154<H>  3.0<L>   |  18  |  0.5<L>    Ca    7.8<L>      15 Apr 2022 08:42  Mg     1.8     04-15    TPro  5.2<L>  /  Alb  2.6<L>  /  TBili  1.8<H>  /  DBili  x   /  AST  67<H>  /  ALT  69<H>  /  AlkPhos  157<H>  04-15    LIVER FUNCTIONS - ( 15 Apr 2022 08:42 )  Alb: 2.6 g/dL / Pro: 5.2 g/dL / ALK PHOS: 157 U/L / ALT: 69 U/L / AST: 67 U/L / GGT: x                 Culture - Blood (collected 04-10-22 @ 21:45)  Source: .Blood Blood-Peripheral  Preliminary Report (04-12-22 @ 20:01):    No growth to date.    Culture - Blood (collected 04-10-22 @ 21:45)  Source: .Blood Blood-Peripheral  Preliminary Report (04-12-22 @ 20:01):    No growth to date.            Ferritin, Serum: 914 ng/mL (04-13-22 @ 06:38)      COVID-19 PCR: NotDetec (04-10-22 @ 20:25)            Indwelling Urethral Catheter:     Connect To:  Leg Bag    Indication:  Urine Output Monitoring in Critically Ill (04-12-22 @ 03:13) (not performed)  Indwelling Urethral Catheter:     Connect To:  Straight Drainage/Ehrhardt    Indication:  Urinary Retention / Obstruction (04-11-22 @ 15:21) (not performed)      RADIOLOGY, ECG, & ADDITIONAL TESTS:  12 Lead ECG:   Ventricular Rate 85 BPM    Atrial Rate 85 BPM    P-R Interval 118 ms    QRS Duration 70 ms    Q-T Interval 508 ms    QTC Calculation(Bazett) 604 ms    P Axis 82 degrees    R Axis 71 degrees    T Axis 55 degrees    Diagnosis Line Normal sinus rhythm  Possible Left atrial enlargement  Nonspecific ST and T wave abnormality  Abnormal ECG    Confirmed by STEPHANIE BLOCK MD ([id number]) on 4/11/2022 12:21:38 PM (04-10-22 @ 19:18)      RECENT DIAGNOSTIC ORDERS:  Serum Pro-Brain Natriuretic Peptide: Routine (04-16-22 @ 06:20)  Xray Chest 1 View-PORTABLE IMMEDIATE: IMMEDIATE   Indication: dyspnea  Transport: Portable  Exam Completed  Provider's Contact #: [phone] (04-16-22 @ 06:10)  Comprehensive Metabolic Panel: AM Sched. Collection: 16-Apr-2022 04:30 (04-15-22 @ 11:54)  Complete Blood Count: AM Sched. Collection: 16-Apr-2022 04:30 (04-15-22 @ 11:54)  Magnesium, Serum: AM Sched. Collection: 16-Apr-2022 04:30 (04-15-22 @ 11:54)      MEDICATIONS:  MEDICATIONS  (STANDING):  latanoprost 0.005% Ophthalmic Solution 1 Drop(s) Both EYES at bedtime  pantoprazole  Injectable 40 milliGRAM(s) IV Push every 12 hours  piperacillin/tazobactam IVPB.. 3.375 Gram(s) IV Intermittent every 12 hours    MEDICATIONS  (PRN):  HYDROmorphone  Injectable 1 milliGRAM(s) IV Push every 4 hours PRN Moderate Pain (4 - 6)  HYDROmorphone  Injectable 2 milliGRAM(s) IV Push every 6 hours PRN Severe Pain (7 - 10)  ondansetron Injectable 4 milliGRAM(s) IV Push every 6 hours PRN Nausea  temazepam 15 milliGRAM(s) Oral at bedtime PRN Insomnia      HOME MEDICATIONS:      PHYSICAL EXAM:  General: cachectic    HEENT: normocephalic, atraumatic  Lungs: clear to auscultations bilaterally  Heart: regular rate and rhythm, normal S1 and S2   Abdomen: soft, mild tender, + distended  Extremities: no edema   Neuro no focal

## 2022-04-16 NOTE — CHART NOTE - NSCHARTNOTEFT_GEN_A_CORE
ABG is PH- 7.41  pCO2- 31  and pO2 -94 with LA- 1.9 on NRB. CXR to me shows diffuse infiltrates which (to me) be pulmonary edema.  now at bedside. Will order IV Lasix and 2 mg morphine due to continued, though somewhat less, distress. Add BNP to am labs while awaiting official CXR report

## 2022-04-17 NOTE — PROGRESS NOTE ADULT - SUBJECTIVE AND OBJECTIVE BOX
Patient is a 62y old  Female who presents with a chief complaint of Pancreatic cancer (14 Apr 2022 16:20)       Pt is seen and examined   lying in bed in distress  family at bed side         ROS:  Negative except for:generalized weakness    MEDICATIONS  (STANDING):  dextrose 5% + sodium chloride 0.45%. 1000 milliLiter(s) (50 mL/Hr) IV Continuous <Continuous>  latanoprost 0.005% Ophthalmic Solution 1 Drop(s) Both EYES at bedtime  pantoprazole  Injectable 40 milliGRAM(s) IV Push every 12 hours  piperacillin/tazobactam IVPB.. 3.375 Gram(s) IV Intermittent every 12 hours    MEDICATIONS  (PRN):  HYDROmorphone  Injectable 1 milliGRAM(s) IV Push every 4 hours PRN Moderate Pain (4 - 6)  HYDROmorphone  Injectable 2 milliGRAM(s) IV Push every 6 hours PRN Severe Pain (7 - 10)  ondansetron Injectable 4 milliGRAM(s) IV Push every 6 hours PRN Nausea  temazepam 15 milliGRAM(s) Oral at bedtime PRN Insomnia      Allergies    No Known Allergies    Intolerances        Vital Signs Last 24 Hrs  VSS    PHYSICAL EXAM  General: lethargic, getting face mask  HEENT: clear oropharynx, anicteric sclera, pink conjunctiva  Neck: supple  CV: normal S1/S2 with no murmur rubs or gallops  Lungs: positive air movement b/l ant lungs,clear to auscultation, no wheezes, no rales  Abdomen: soft non-tender non-distended, no hepatosplenomegaly  Ext: no clubbing cyanosis or edema  Skin: no rashes and no petechiae  Neuro: alert and oriented X 4, no focal deficits  LABS:                          11.3   23.80 )-----------( 23       ( 15 Apr 2022 08:42 )             34.9         Mean Cell Volume : 94.8 fL  Mean Cell Hemoglobin : 30.7 pg  Mean Cell Hemoglobin Concentration : 32.4 g/dL  Auto Neutrophil # : 21.43 K/uL  Auto Lymphocyte # : 1.07 K/uL  Auto Monocyte # : 0.70 K/uL  Auto Eosinophil # : 0.04 K/uL  Auto Basophil # : 0.07 K/uL  Auto Neutrophil % : 90.0 %  Auto Lymphocyte % : 4.5 %  Auto Monocyte % : 2.9 %  Auto Eosinophil % : 0.2 %  Auto Basophil % : 0.3 %    Serial CBC's  04-15 @ 08:42  Hct-34.9 / Hgb-11.3 / Plat-23 / RBC-3.68 / WBC-23.80          Serial CBC's  04-14 @ 08:56  Hct-33.8 / Hgb-10.9 / Plat-27 / RBC-3.58 / WBC-19.91          Serial CBC's  04-13 @ 13:56  Hct-33.4 / Hgb-10.9 / Plat-43 / RBC-3.56 / WBC-13.68          Serial CBC's  04-13 @ 06:38  Hct--- / Hgb--- / Plat--- / RBC-3.58 / WBC---          Serial CBC's  04-12 @ 08:12  Hct-32.6 / Hgb-10.7 / Plat-53 / RBC-3.52 / WBC-12.02            04-15    148<H>  |  117<H>  |  11  ----------------------------<  154<H>  3.0<L>   |  18  |  0.5<L>    Ca    7.8<L>      15 Apr 2022 08:42  Phos  2.6     04-14  Mg     1.8     04-15    TPro  5.2<L>  /  Alb  2.6<L>  /  TBili  1.8<H>  /  DBili  x   /  AST  67<H>  /  ALT  69<H>  /  AlkPhos  157<H>  04-15      PT/INR - ( 14 Apr 2022 08:56 )   PT: 20.10 sec;   INR: 1.76 ratio         PTT - ( 14 Apr 2022 08:56 )  PTT:26.0 sec    WBC Count: 23.80 K/uL (04-15-22 @ 08:42)  Hemoglobin: 11.3 g/dL (04-15-22 @ 08:42)  Hematocrit: 34.9 % (04-15-22 @ 08:42)  Platelet Count - Automated: 23 K/uL (04-15-22 @ 08:42)  WBC Count: 19.91 K/uL (04-14-22 @ 08:56)  Hemoglobin: 10.9 g/dL (04-14-22 @ 08:56)  Hematocrit: 33.8 % (04-14-22 @ 08:56)  Platelet Count - Automated: 27 K/uL (04-14-22 @ 08:56)  WBC Count: 13.68 K/uL (04-13-22 @ 13:56)  Hemoglobin: 10.9 g/dL (04-13-22 @ 13:56)  Hematocrit: 33.4 % (04-13-22 @ 13:56)  Platelet Count - Automated: 43 K/uL (04-13-22 @ 13:56)  Reticulocyte Percent: 1.7 % (04-13-22 @ 06:38)  Iron - Total Binding Capacity.: 159 ug/dL (04-13-22 @ 06:38)  Ferritin, Serum: 914 ng/mL (04-13-22 @ 06:38)  WBC Count: 12.02 K/uL (04-12-22 @ 08:12)  Hemoglobin: 10.7 g/dL (04-12-22 @ 08:12)  Hematocrit: 32.6 % (04-12-22 @ 08:12)  Platelet Count - Automated: 53 K/uL (04-12-22 @ 08:12)  WBC Count: 11.36 K/uL (04-11-22 @ 15:41)  Hemoglobin: 11.4 g/dL (04-11-22 @ 15:41)  Hematocrit: 34.7 % (04-11-22 @ 15:41)  Platelet Count - Automated: 75 K/uL (04-11-22 @ 15:41)  Hemoglobin: 12.6 g/dL (04-10-22 @ 19:45)  Hematocrit: 39.6 % (04-10-22 @ 19:45)  Platelet Count - Automated: 124 K/uL (04-10-22 @ 19:45)  WBC Count: 10.86 K/uL (04-10-22 @ 19:45)                BLOOD SMEAR INTERPRETATION:       RADIOLOGY & ADDITIONAL STUDIES:

## 2022-04-17 NOTE — PROGRESS NOTE ADULT - SUBJECTIVE AND OBJECTIVE BOX
DILIP ROBERTS  62y, Female  Allergy: No Known Allergies    Hospital Day: 5d    Patient seen and examined earlier today.     Subjective  No acive issues overnight, pain is controlled, pending hospice evaluation     PMH/PSH:  PAST MEDICAL & SURGICAL HISTORY:  Glaucoma    Pancreatic cancer        LAST 24-Hr EVENTS:    VITALS:  T(F): 97.4 (04-16-22 @ 05:00), Max: 98.2 (04-15-22 @ 14:05)  HR: 90 (04-16-22 @ 06:22)  BP: 117/57 (04-16-22 @ 06:22) (92/60 - 139/63)  RR: 16 (04-16-22 @ 06:22)  SpO2: --          TESTS & MEASUREMENTS:  Weight/BMI      04-14-22 @ 07:01  -  04-15-22 @ 07:00  --------------------------------------------------------  IN: 0 mL / OUT: 750 mL / NET: -750 mL    04-15-22 @ 07:01  -  04-16-22 @ 07:00  --------------------------------------------------------  IN: 0 mL / OUT: 350 mL / NET: -350 mL                            11.3   23.80 )-----------( 23       ( 15 Apr 2022 08:42 )             34.9       INR: 1.76 ratio (04-14-22 @ 08:56)  INR: 1.88 ratio (04-11-22 @ 15:45)    04-15    148<H>  |  117<H>  |  11  ----------------------------<  154<H>  3.0<L>   |  18  |  0.5<L>    Ca    7.8<L>      15 Apr 2022 08:42  Mg     1.8     04-15    TPro  5.2<L>  /  Alb  2.6<L>  /  TBili  1.8<H>  /  DBili  x   /  AST  67<H>  /  ALT  69<H>  /  AlkPhos  157<H>  04-15    LIVER FUNCTIONS - ( 15 Apr 2022 08:42 )  Alb: 2.6 g/dL / Pro: 5.2 g/dL / ALK PHOS: 157 U/L / ALT: 69 U/L / AST: 67 U/L / GGT: x                 Culture - Blood (collected 04-10-22 @ 21:45)  Source: .Blood Blood-Peripheral  Preliminary Report (04-12-22 @ 20:01):    No growth to date.    Culture - Blood (collected 04-10-22 @ 21:45)  Source: .Blood Blood-Peripheral  Preliminary Report (04-12-22 @ 20:01):    No growth to date.            Ferritin, Serum: 914 ng/mL (04-13-22 @ 06:38)      COVID-19 PCR: NotDetec (04-10-22 @ 20:25)            Indwelling Urethral Catheter:     Connect To:  Leg Bag    Indication:  Urine Output Monitoring in Critically Ill (04-12-22 @ 03:13) (not performed)  Indwelling Urethral Catheter:     Connect To:  Straight Drainage/[place]    Indication:  Urinary Retention / Obstruction (04-11-22 @ 15:21) (not performed)      RADIOLOGY, ECG, & ADDITIONAL TESTS:  12 Lead ECG:   Ventricular Rate 85 BPM    Atrial Rate 85 BPM    P-R Interval 118 ms    QRS Duration 70 ms    Q-T Interval 508 ms    QTC Calculation(Bazett) 604 ms    P Axis 82 degrees    R Axis 71 degrees    T Axis 55 degrees    Diagnosis Line Normal sinus rhythm  Possible Left atrial enlargement  Nonspecific ST and T wave abnormality  Abnormal ECG    Confirmed by STEPHANIE BLOCK MD ([id number]) on 4/11/2022 12:21:38 PM (04-10-22 @ 19:18)      RECENT DIAGNOSTIC ORDERS:  Serum Pro-Brain Natriuretic Peptide: Routine (04-16-22 @ 06:20)  Xray Chest 1 View-PORTABLE IMMEDIATE: IMMEDIATE   Indication: dyspnea  Transport: Portable  Exam Completed  Provider's Contact #: [phone] (04-16-22 @ 06:10)  Comprehensive Metabolic Panel: AM Sched. Collection: 16-Apr-2022 04:30 (04-15-22 @ 11:54)  Complete Blood Count: AM Sched. Collection: 16-Apr-2022 04:30 (04-15-22 @ 11:54)  Magnesium, Serum: AM Sched. Collection: 16-Apr-2022 04:30 (04-15-22 @ 11:54)      MEDICATIONS:  MEDICATIONS  (STANDING):  latanoprost 0.005% Ophthalmic Solution 1 Drop(s) Both EYES at bedtime  pantoprazole  Injectable 40 milliGRAM(s) IV Push every 12 hours  piperacillin/tazobactam IVPB.. 3.375 Gram(s) IV Intermittent every 12 hours    MEDICATIONS  (PRN):  HYDROmorphone  Injectable 1 milliGRAM(s) IV Push every 4 hours PRN Moderate Pain (4 - 6)  HYDROmorphone  Injectable 2 milliGRAM(s) IV Push every 6 hours PRN Severe Pain (7 - 10)  ondansetron Injectable 4 milliGRAM(s) IV Push every 6 hours PRN Nausea  temazepam 15 milliGRAM(s) Oral at bedtime PRN Insomnia      HOME MEDICATIONS:      PHYSICAL EXAM:  General: cachectic    HEENT: normocephalic, atraumatic  Lungs: clear to auscultations bilaterally  Heart: regular rate and rhythm, normal S1 and S2   Abdomen: soft, mild tender, + distended  Extremities: no edema   Neuro no focal

## 2022-04-18 NOTE — PROGRESS NOTE ADULT - SUBJECTIVE AND OBJECTIVE BOX
DIAGNOSIS:   HOSPITAL DAY #:    STATUS POST:    POST OPERATIVE DAY #:     Vital Signs Last 24 Hrs  T(C): --  T(F): --  HR: --  BP: --  BP(mean): --  RR: --  SpO2: --    SUBJECTIVE: Pt seen    Pain: YES  [ ]   NO [ ]   Nausea: [ ] YES [ ] NO           Vomiting: [ ] YES [ ] NO  Flatus: [ ] YES [ ] NO             Bowel Movement: [ ] YES [ ] NO     Void: [ ]YES [ ]No      BHUMIKA DRAINAGE: SIGNIFICANT [ ]   NOT SIGNIFICANT [ ]   NOT APPLICABLE [ ]  YES [ ] NO    General Appearance: Appears well, NAD  Neck: Supple  Chest: Equal expansion bilaterally, equal breath sounds  CV: Pulse regular presently  Abdomen: Soft [x ] YES [ ]NO  DISTENDED [ ] YES [x ] NO TENDERNESS [ ]YES [x ]NO  INCISIONS: HEALING WELL [ ] YES  [ ] NO ERYTHEMA [ ] YES [ ] NO DRAINAGE [ ] YES  [ ] NO  Extremities: Grossly symmetric, CALF TENDERNESS [ ] YES  [ ] NO      LABS:                  ASSESSMENT: discussed case with pt family    GOOD POST OP COURSE [ ]  YES  [ ] NO  CONDITION IMPROVING  []  YES  [ ]  NO          PLAN:    CONTINUE PRESENT MANAGEMENT  [ ] YES  [ ] NO

## 2022-04-18 NOTE — PROGRESS NOTE ADULT - NUTRITIONAL ASSESSMENT
This patient has been assessed with a concern for Malnutrition and has been determined to have a diagnosis/diagnoses of Severe protein-calorie malnutrition and Underweight (BMI < 19).    This patient is being managed with:   Diet Clear Liquid-  Prosource Gelatein Plus     Qty per Day:  3x  Supplement Feeding Modality:  Oral  Ensure Clear Cans or Servings Per Day:  3       Frequency:  Daily  Entered: Apr 12 2022  7:21PM    

## 2022-04-18 NOTE — HOSPICE CARE NOTE - CONVESATION DETAILS
Hospice consult has been received - Chart reviewed. Hospice RN (Writer) - Spoke with Unit Nurse- as patient is Actively dying, started in CMO  currently on 1mg- IV  Dilaudid IV push Q 1 hr, PRN. Patient remains on 100% Non- rebreather. Patient is  in the process of receiving last rights as per unit nurse.   Hospice to remain available, As patient is actively dying  not  appropriate at this time for hospice to reach out to family to discuss a Dc plan.

## 2022-04-18 NOTE — PROGRESS NOTE ADULT - PROVIDER SPECIALTY LIST ADULT
Heme/Onc
Heme/Onc
Hospitalist
Infectious Disease
Gastroenterology
Heme/Onc
Surgery
Surgery
Hospitalist
Surgery
Hospitalist

## 2022-04-18 NOTE — PROGRESS NOTE ADULT - SUBJECTIVE AND OBJECTIVE BOX
Patient is a 62y old  Female who presents with a chief complaint of Pancreatic cancer (14 Apr 2022 16:20)      T(F): 96.3 (04-17-22 @ 15:28), Max: 96.3 (04-17-22 @ 15:28)  HR: 103 (04-17-22 @ 15:28)  BP: 106/53 (04-17-22 @ 15:28)  RR: 18 (04-17-22 @ 15:28)      PHYSICAL EXAM:  GENERAL: NAD  HEAD:  Atraumatic, Normocephalic  EYES: EOMI, PERRLA, conjunctiva and sclera clear  NERVOUS SYSTEM:  Alert & Oriented X3, no focal deficits   CHEST/LUNG: Clear to percussion bilaterally; No rales, rhonchi, wheezing, or rubs  HEART: Regular rate and rhythm; No murmurs, rubs, or gallops  ABDOMEN: Soft, Nontender, Nondistended; Bowel sounds present  EXTREMITIES:  2+ Peripheral Pulses, No clubbing, cyanosis, or edema    LABS  04-16    148<H>  |  114<H>  |  13  ----------------------------<  109<H>  3.4<L>   |  16<L>  |  0.6<L>    Ca    7.8<L>      16 Apr 2022 11:00  Mg     1.9     04-16    TPro  5.4<L>  /  Alb  2.7<L>  /  TBili  1.7<H>  /  DBili  x   /  AST  114<H>  /  ALT  129<H>  /  AlkPhos  200<H>  04-16                          12.1   57.43 )-----------( 19       ( 16 Apr 2022 11:00 )             38.8         Culture Results:   No Growth Final (04-10-22)  Culture Results:   No Growth Final (04-10-22)    RADIOLOGY  < from: [facility] Duplex Lower Ext Vein Scan, Bilat (04.15.22 @ 20:16) >  Impression:    No evidence of deep venous thrombosis or superficial thrombophlebitis in   the bilateral lower extremities.    < end of copied text >  < from: Xray Chest 1 View-PORTABLE IMMEDIATE (Xray Chest 1 View-PORTABLE IMMEDIATE .) (04.16.22 @ 06:46) >  Impression: Bilateral opacities/right pleural effusion.    < end of copied text >    MEDICATIONS  (STANDING):  latanoprost 0.005% Ophthalmic Solution 1 Drop(s) Both EYES at bedtime  pantoprazole  Injectable 40 milliGRAM(s) IV Push every 12 hours  piperacillin/tazobactam IVPB.. 3.375 Gram(s) IV Intermittent every 12 hours    MEDICATIONS  (PRN):  HYDROmorphone  Injectable 1 milliGRAM(s) IV Push every 4 hours PRN Moderate Pain (4 - 6)  HYDROmorphone  Injectable 2 milliGRAM(s) IV Push every 6 hours PRN Severe Pain (7 - 10)  ondansetron Injectable 4 milliGRAM(s) IV Push every 6 hours PRN Nausea  temazepam 15 milliGRAM(s) Oral at bedtime PRN Insomnia      Patient appears comfortable on FM oxygen      T(F): 96.3 (04-17-22 @ 15:28), Max: 96.3 (04-17-22 @ 15:28)  HR: 103 (04-17-22 @ 15:28)  BP: 106/53 (04-17-22 @ 15:28)  RR: 18 (04-17-22 @ 15:28)      PHYSICAL EXAM:  GENERAL: NAD  HEAD:  Atraumatic, Normocephalic  EYES: EOMI, PERRLA, conjunctiva and sclera clear  NERVOUS SYSTEM:  no focal deficits   CHEST/LUNG:  bilateral rhonchi  HEART: Regular rate and rhythm; No murmurs, rubs, or gallops  ABDOMEN: Soft, Nontender, Nondistended; Bowel sounds present  EXTREMITIES:  2+ Peripheral Pulses, No clubbing, cyanosis, or edema    LABS  04-16    148<H>  |  114<H>  |  13  ----------------------------<  109<H>  3.4<L>   |  16<L>  |  0.6<L>    Ca    7.8<L>      16 Apr 2022 11:00  Mg     1.9     04-16    TPro  5.4<L>  /  Alb  2.7<L>  /  TBili  1.7<H>  /  DBili  x   /  AST  114<H>  /  ALT  129<H>  /  AlkPhos  200<H>  04-16                          12.1   57.43 )-----------( 19       ( 16 Apr 2022 11:00 )             38.8         Culture Results:   No Growth Final (04-10-22)  Culture Results:   No Growth Final (04-10-22)    RADIOLOGY  < from: VA Duplex Lower Ext Vein Scan, Bilat (04.15.22 @ 20:16) >  Impression:    No evidence of deep venous thrombosis or superficial thrombophlebitis in   the bilateral lower extremities.    < end of copied text >  < from: Xray Chest 1 View-PORTABLE IMMEDIATE (Xray Chest 1 View-PORTABLE IMMEDIATE .) (04.16.22 @ 06:46) >  Impression: Bilateral opacities/right pleural effusion.    < end of copied text >    MEDICATIONS  (STANDING):  latanoprost 0.005% Ophthalmic Solution 1 Drop(s) Both EYES at bedtime  pantoprazole  Injectable 40 milliGRAM(s) IV Push every 12 hours  piperacillin/tazobactam IVPB.. 3.375 Gram(s) IV Intermittent every 12 hours    MEDICATIONS  (PRN):  HYDROmorphone  Injectable 1 milliGRAM(s) IV Push every 4 hours PRN Moderate Pain (4 - 6)  HYDROmorphone  Injectable 2 milliGRAM(s) IV Push every 6 hours PRN Severe Pain (7 - 10)  ondansetron Injectable 4 milliGRAM(s) IV Push every 6 hours PRN Nausea  temazepam 15 milliGRAM(s) Oral at bedtime PRN Insomnia

## 2022-04-18 NOTE — PROGRESS NOTE ADULT - ASSESSMENT
ASSESSMENT:  62y F w/ PMHx of newly diagnosed metastatic pancreatic cancer, admitted for increased lethargy, found to have pneumoperitoneum    PLAN:  -Thrombocytopenia, acute currently in workup, Hematology/Oncology following. Plan for Chemoport placement when thrombocytopenia resolves. ? Due to abx?? will discuss with ID regarding d/c antibiotics as pneumoperitoneum likely not from true perforation, although WBC is increasing, may consider changing regimen. Patient without abdominal tenderness/pain, PO Contrast does not show extravasation, patient is tolerating diet.  -Considering transfer to medical service, will discuss with medical team as this patient is not a surgical candidate in terms of pancreatic CA, patient without acute abdomen, surgery would be following for port-a-cath placement    Lines/Tubes: PIV,  Block Catheter.  
 62y F pmh of  glaucoma,  Pancreatic cancer diagnosed   4/4/22, S/P biliary stent at [facility]  4/5/22, S/P pancreatic stent on 4/7/22, presented for worsening weakness     #worsening Pancreatic cancer with mets   - s/p biliary stent 4/5/2022  - s/p pancreatic stent 4/7/2022  - f/u with oncology  - palliative consulted  -currently DNR/DNI  -f/u with hospice         #Pneumoperitoneum   -  on CT Abdomen and Pelvis  -surgery following no intervention     #Leukocytosis   ? PNA VS intraabdominal infection  c/w Merrem fluconazole     #Thrombocytopenia   f/u with hematology       #DVT PPX  SCD due to thrombocytopenia     Discharge planning: hospice   Consults: oncology, surgery, ID  Barriers for discharge: pain control  Disposition: hospice home vs facility     Handoff: follow up with hospice care 
 62yFemale pmh  glaucoma recently diagnosed with Pancreatic cancer at [facility] s/p PD stent presents for abdominal pain and weakness.    Problem 1-Possible contained perforation based off repeat CT scan  Pneumoperitoneum. In the absence of recent surgical intervention,   findings presumably reflecting perforation. A definitive site of   perforation is not identified, however the majority of the air appears in   the upper abdomen/right upper quadrant and therefore suspect either an   upper GI perforation or the possibility of biliary perforation given   recent stent placement and air within the biliary tree.  Free fluid predominantly along the right greater than left   abdomen/paracolic gutters. With partial surrounding enhancement on the   right. Findings may reflect partially loculated fluid/developing abscess   or the possibility of peritoneal enhancement/peritonitis. Free fluid may   reflect bile leak if biliary perforation.  Rec  -conservative management, diet resumption as per surgery, IV antibiotics  -surgical follow-up  -oncology consult  -continue abx    Problem 2-Pancreatic mass compatible with provided history of pancreatic cancer.   Multiple hypovascular liver masses, presumably metastatic lesions.  Rec  -oncology consult    Problem 3-Descending colonic wall thickening, may be reactive or reflect colitis. (asymptomatic)  Rec  -watchful waiting    Problem 4-Fluid-filled and distended stomach and proximal duodenum may reflect   associated ileus/partial obstruction with fluid extending into the distal   esophagus which may place patient at increased risk for aspiration  Rec  -patient passing flatus having bowel movements       Recall GI if needed 
63 yo female has newly diagnosed pancreatic cancer, s/p stent placement at [facility] on 4/7  CT a/p shows Pneumoperitoneum. ? upper GI perforation or the possibility of biliary perforation given recent stent placement and air within the biliary tree.   Sx following .  Pancreatic mass c/w hx of newly diagnosed met pancreatic cancer with multiple hypovascular liver masses on Ct scan   reviewed path from [facility],liver bx showed adenoca c/w primary pancreaticobiliary   needs palliative chemo for met pancreatic ca ,ct chest /bone scan neg for mets  anemia ,stable ,likely multifactorial   worsening thrombocytopenia.  ?sepsis/zosyn  ?PNA on ct chest  abx changed as per ID  f/u HIT ab and TY test (recent hospital admission with heparin exposure 2-3 wks back)  LE doppler   tfx to keep plt >15 or any s/s of bleeding   will start chemo as outpt.  plan of care was explained to pt and her family at length again today  leila cath placement when stable  KOKO,f/u with nephro  iv hydration  cont other supportive care .  will f/u     
63 yo female has newly diagnosed pancreatic cancer, s/p stent placement at [facility] on 4/7  CT a/p shows Pneumoperitoneum. ? upper GI perforation or the possibility of biliary perforation given recent stent placement and air within the biliary tree.   Sx following .  Pancreatic mass c/w hx of newly diagnosed met pancreatic cancer with multiple hypovascular liver masses on Ct scan   reviewed path from [facility],liver bx showed adenoca c/w primary pancreaticobiliary   needs palliative chemo for met pancreatic ca ,ct chest /bone scan neg for mets  anemia ,stable ,likely multifactorial     worsening thrombocytopenia.  ?sepsis..on antibx   ?PNA on ct chest  abx changed as per ID  f/u HIT ab and TY test   LE doppler   tfx to keep plt >15 or any s/s of bleeding      respiratory distress... on face mask>>comfort care measure only>>> comfortable   family at bed side      KOKO,f/u with nephro  iv hydration    cont other supportive care .  will f/u     
Pt is a 62 year old female with recently diagnosed metastatic pancreatic cancer, s/p biliary decompression with pancreatic stent placement 4/7/22 at [facility] by Dr. Alejandre, now admitted for abdominal pain with pneumoperitoneum    Plan:  Nonoperative management of pneumoperitoneum:  -Bowel rest  -Serial abdominal exams  -IV PPI  -Broad spectrum abx    Follow up with palliative care and oncology      
ASSESSMENT   62y F pmh- glaucoma,  Pancreatic CANCER- DIAGNOSED  4/4/22, S/P BILIARY STENT PLACEMENT - 4/5/22, S/P PANCREATIC STENT PLACEMENT 4/7/22 -ALL PROCEDURES DONE BY DR JANG  --IR  -AT [facility] presents for eval of weakness.,    IMPRESSION  #Pancreatic cancer   - s/p biliary stent 4/5/2022  - s/p pancreatic stent 4/7/2022    #Pneumoperitoneum   -  CT Abdomen and Pelvis w/ Oral Cont (04.11.22 @ 05:26): When compared to examination performed 6-7 hours prior: No definitive extraluminal contrast extravasation was identified to localize/confirm a site of bowel perforation. Additional interval changes as above.    #Leukocytosis   - CT Chest w/ IV Cont (04.14.22 @ 17:24): Small pleural effusions with adjacent atelectasis. Underlying pneumonia  not excluded. No suspicious nodules or masses of the aerated lung. Increased upper abdominal free fluid, pneumobilia and decreased  pneumoperitoneum of the partially visualized upper abdomen, better  characterized on recent CT abdomen and pelvis from April 11, 2022.    #Thrombocytopenia     #Abx allergy: NKDA    Antibiotics  Cefepime 4/10   Zosyn 4/11-present     RECOMMENDATIONS  - Leukocytosis possibly reactive to pneumoperitoneum/pancreatic cancer although there is increased upper abdominal free fluid/pneumobilia -- there is decreased pneumoperitoneum; CT chest with bilateral pleural effusions and atelectasis   - add fluconazole 400 mg x 1, followed by 200 mg daily given suspected upper GI perforation   - zosyn atypical to cause thrombocytopenia this rapidly, but can switch to cefepime 1g q 8 hours and flagyl 500 mg TID   - trend WBC and PLT  - check C diff if developing diarrhea   - check fungitell  - Abdominal exam is benign but may need repeat CT Abd/Pelvis if WBC continues to worsen   - check lower extremity US     Please call or message on Microsoft Teams if with any questions.  Spectra 1850
ASSESSMENT:  62y F w/ PMHx of newly diagnosed Metastatic Pancreatic CA, presents with weakness. She was found to have pneumoperitoneum on presentation, However, do not think there was perforation. Probably secondary to recent instrumentation as patient had 3 separate percutaneous Transhepatic procedures, and air was localized to suprahepatic region.     PLAN:  - continue liquid diet, will advance as tolerated  - Ordered thrombocytopenia workup labs per hematology  - Plan for Chemo-Port placement once thrombocytopenia resolves      
Pt is a 62 year old female with pancreatic cancer s/p pancreatic stent placement at [facility] 4/7/22, admitted for abdominal pain      Plan:  -Currently on clear diet, advance to solids as tolerated if Dr. Hay agrees  -Serial abdominal exams  -IV PPI --> pantoprazole  Injectable 40 milliGRAM(s) IV Push every 12 hours  -Broad spectrum abx --> piperacillin/tazobactam IVPB.. 3.375 Gram(s) IV Intermittent every 12 hours  -IV fluids --> sodium chloride 0.9%. 1000 milliLiter(s) (100 mL/Hr) IV Continuous <Continuous>  -Pain management with Dilaudid   -Needs leila cath placed this stay for outpatient chemo  -Worsening thrombocytopenia 124 --> 75 --> 53, Heme/Onc following up  -Will followup with Dr. Luther outpatient  -Follow up with palliative care  
61 yo female has newly diagnosed pancreatic cancer, s/p stent placement at [facility] on 4/7  CT a/p shows Pneumoperitoneum. ? upper GI perforation or the possibility of biliary perforation given recent stent placement and air within the biliary tree.   Sx following .  Pancreatic mass c/w hx of newly diagnosed met pancreatic cancer with multiple hypovascular liver masses on Ct scan   will review path from [facility].  needs palliative chemo for met pancreatic ca pending path review.  worsening anemia/thrombocytopenia.  ?sepsis  repeat CBC today  check hapto  check iron profile/ferritin .  will start chemo as outpt.  pet/ct as outpt   plan of care was explained to pt and her family at length again today  pt is agreeable to start Rx  please place leila cath when pt is stable  KOKO,f/u with nephro  iv hydration  cont other supportive care .  will f/u 
61 yo female has newly diagnosed pancreatic cancer, s/p stent placement at [facility] on 4/7  CT a/p shows Pneumoperitoneum. ? upper GI perforation or the possibility of biliary perforation given recent stent placement and air within the biliary tree.   Sx following .  Pancreatic mass c/w hx of newly diagnosed met pancreatic cancer with multiple hypovascular liver masses on Ct scan   will review path from [facility].  needs palliative chemo for met pancreatic ca pending path review.   anemia ,stable ,likely multifactorial   worsening thrombocytopenia.  send HIT ab and TY test  ?sepsis/meds  tfx to keep plt >15 or any s/s of bleeding   high LDH /retic count is unremarkable.  get ct chest /bone scan to assess for  distant mets  will start chemo as outpt.  plan of care was explained to pt and her family at length again today  pt is agreeable to start Rx  please place leila cath when pt is stable  KOKO,f/u with nephro  iv hydration  cont other supportive care .  will f/u 
63 yo female has newly diagnosed pancreatic cancer, s/p stent placement at [facility] on 4/7  CT a/p shows Pneumoperitoneum. ? upper GI perforation or the possibility of biliary perforation given recent stent placement and air within the biliary tree.   Sx following .  Pancreatic mass c/w hx of newly diagnosed met pancreatic cancer with multiple hypovascular liver masses on Ct scan   reviewed path from [facility],liver bx showed adenoca c/w primary pancreaticobiliary   needs palliative chemo for met pancreatic ca ,ct chest /bone scan neg for mets  anemia ,stable ,likely multifactorial     worsening thrombocytopenia.  ?sepsis..on antibx   ?PNA on ct chest  abx changed as per ID  f/u HIT ab and TY test   LE doppler   tfx to keep plt >15 or any s/s of bleeding      respiratory distress... on face mask  family at bed side  discussing comfort care only    KOKO,f/u with nephro  iv hydration    cont other supportive care .  will f/u     
 62y F pmh of,  Pancreatic cancer diagnosed   4/4/22, S/P biliary stent at [facility]  4/5/22, S/P pancreatic stent on 4/7/22, presented for worsening weakness     #worsening Pancreatic cancer with mets      -  at bedside reports he wants no blood work or interventions    - Dilaudid q1h prn  - palliative consulted  - DNR/DNI  -f/u with hospice     
 62y F pmh of  glaucoma,  Pancreatic cancer diagnosed   4/4/22, S/P biliary stent at [facility]  4/5/22, S/P pancreatic stent on 4/7/22, presented for worsening weakness     #worsening Pancreatic cancer with mets   - s/p biliary stent 4/5/2022  - s/p pancreatic stent 4/7/2022  - f/u with oncology  - palliative consulted  -currently DNR/DNI  -f/u with hospice         #Pneumoperitoneum   -  on CT Abdomen and Pelvis  -surgery following no intervention     #Leukocytosis   ? PNA VS intraabdominal infection  c/w Merrem fluconazole     #Thrombocytopenia   f/u with hematology       #DVT PPX  SCD due to thrombocytopenia

## 2022-04-19 NOTE — DISCHARGE NOTE FOR THE EXPIRED PATIENT - HOSPITAL COURSE
61yo female with history of pancreatic cancer was admitted on April 11, 2022 with perforated bowel. Was felt not to be a candidate for surgery and ultimately placed on comfort care On the morning of April 19, 2022 I was called as patient found without vital signs. Pronounced by myself at 0650,  at bedside

## 2022-04-28 NOTE — CDI QUERY NOTE - NSCDI_DOCCLARIFY2_GEN_ALL_CORE_FT
Documentation clarification is required for accuracy in coding and billing, claim validation and reporting severity of illness, quality data and risk of mortality.

## 2022-04-28 NOTE — CDI QUERY NOTE - NSCDI_DOCCLARIFY_GEN_ALL_CORE_FT
In responding to this request, please exercise your independent professional judgment.  The fact that a question is asked does not imply that any particular answer is desired or expected.

## 2022-04-28 NOTE — CDI QUERY NOTE - NSCDINOTEDOCCLARIFICATION_GEN_A_CORE
PLEASE INCLUDE MORE SPECIFIC DOCUMENTATION IN YOUR PROGRESS NOTE AND DISCHARGE SUMMARY.  The documentation in this patient's medical record requires additional clarification to ensure that we accurately capture the patients diagnosis(es), treatment and/or severity of illness. Please document to the greatest level of specificity all corresponding diagnoses (either known or suspected) and/or treatment associated with the clinical information described below.

## 2022-04-28 NOTE — CDI QUERY NOTE - NSCDIOTHERTXTBX_GEN_ALL_CORE_HH
Clinical Indicators     4/11 H&P Adult: … Pancreatic cancer- diagnosed 4/4/22, s/p biliary stent  placement - 4/5/22, s/p pancreatic stent placement 4/7/22 -all procedures done  by Dr Alejandre  --IR -AT [facility]; … CT ABDOMEN AND PELVIS IC: Findings may reflect  partially loculated fluid/developing abscess or the possibility of peritoneal  enhancement/peritonitis. Free fluid may reflect bile leak if biliary  perforation; … Bowel perforation; …    4/17 Progress Note Adult-Hospitalist Attending: ... #Leukocytosis? PNA VS  intrabdominal infection    4/17 Progress Note Adult-Heme/Onc Attending: … Serial CBC's  04-15 @ 08:42  Hct-34.9 / Hgb-11.3 / Plat-23 / RBC-3.68 / WBC-23.80 Serial CBC's  04-14 @ 08:56  Hct-33.8 / Hgb-10.9 / Plat-27 / RBC-3.58 / WBC-19.91; …? sepsis..on antibx ? PNA  on CT chest; … respiratory distress... on face mask>>comfort care measure only>    Laboratory:   WBC 4/10 10.86-> 4/11 11.36-> … 4/15 23.80-> on 4/16 57.43     Medications: piperacillin/tazobactam IVPB... 3.375 Gram(s) IV Intermittent every  12 hours X 10 doses, then refused by patient- for intra-abdominal infection;  Cefepime 2g IVPB x1 dose for sepsis; Vancomycin 1000 milligrams IVPB x one dose  for sepsis     Query     Based on your clinical judgment and consideration of these clinical indicators,  please clarify if ?sepsis can be further specified as:  * Sepsis evaluated, treated and could not be ruled out at discharge   * Sepsis evaluated, and ruled out at discharge   * Other (please specify)   * Unable to clinically determine    Thank you,  Thao Reyes  [phone]
Clinical indicators     4/11 H&P Adult: … 62y F pmh- glaucoma, Pancreatic Cancer- Diagnosed 4/4/22, S/P  Biliary Stent Placement - 4/5/22, S/P Pancreatic Stent Placement 4/7/22; ...  presents for eval of weakness.    4/14 Consult Note Adult-Hospitalist: · Reason for Referral/Consultation:  co-management; hyperchloremia, hypokalemia, hypomagnesemia; …    4/15 Consult Note Adult-Hospitalist Attending: … Abdominal CT with  Pneumoperitoneum and possible perforation, free fluids, possible peritonitis,  liver mets, possible colitis, possible ileus and partial obstruction; ... Severe  protein-calorie malnutrition    Query      Based on your clinical judgment and consideration of these clinical indicators,  please clarify if weakness can be further specified as:  *Weakness, multifactorial suspected to be associated with metastatic pancreatic  cancer, severe protein-calorie malnutrition, and acute kidney injury was  evaluated could not be ruled out at patient expiration.  *Other (please specify):   *Clinically unable to further specify weakness    Thank you,   Thao Reyes  [phone]
Clinical Indicators     4/11 Consult Note Adult-Surgery Attending: … This is particularly salient in  lieu of her extremely high surgical risk given the extent of the disease  (non-resectable as based on CT) and the patient's malnutrition; ...    4/12 Dietitian Nutrition Risk Notification: Upon Nutritional Assessment by the  Registered Dietitian Your Patient was Determined to Meet Criteria/Has Evidence  of the Following Diagnosis: Severe protein-calorie malnutrition; Other Risk  Factors Underweight (BMI < 19) Etiology-Basis	Acute illness or injury  Malnutrition Evaluation as Demonstrated by (Adults > 20 years of age) Weight  loss...  Inadequate energy intake...  Loss of muscle... Weight Loss	> 5% in  1-month Inadequate Energy Intake </= 50% for >/= 5 days Muscle Mass (Loss of  Muscle) Temples...  Clavicles... Temples Depletion is	severe Clavicles Depletion  is severe    4/14 Consult Note Adult-Hospitalist Attending: … [] Newly diagnosed Metastatic  Pancreatic CA, s/p stent placement at [facility] on 4/7 [] Abdominal CT with  Pneumoperitoneum and possible perforation, free fluids, possible peritonitis,  liver mets , possible colitis, possible ileus and partial obstruction []  Leukocytosis, thrombocytopenia [] Hypernatremia, hyperchloremia, hypokalemia,  hypomagnesemia; …    Query     Based on your clinical judgment and consideration of these clinical indicators,   please clarify if malnutrition can be further specified as   * Severe protein-calorie malnutrition   * Other (please specify   * Unable to clinically determine    Thank you,   Thao Reyes  [phone]
normal...

## 2023-07-19 NOTE — PHYSICAL THERAPY INITIAL EVALUATION ADULT - BALANCE DISTURBANCE, SYSTEM IMPAIRMENT CONTRIBUTE, REHAB EVAL
Plastic and Reconstructive Surgery Postop Note    Reason for Visit  S/p S/p Bilateral breast reconstruction with tissue expanders, [employer]oderm, 5/16/23, (POW 9).    Postoperative assessment    Subjective    Presents for previously scheduled follow up. Doing very well after procedure last week, small amount of drainage but no pain.    Review of systems: No fevers/chills.  No wound symptoms such as redness, swelling, increasing pain, or drainage.  No CP/SOB.      Physical Examination   height is 5' (1.524 m) and weight is 84.5 kg (186 lb 4.6 oz). Her tympanic temperature is 97.3 °F (36.3 °C). Her blood pressure is 106/79 and her pulse is 92. Her respiration is 18 and oxygen saturation is 99%.   Estimated body mass index is 36.38 kg/m² as calculated from the following:    Height as of this encounter: 5' (1.524 m).    Weight as of this encounter: 84.5 kg (186 lb 4.6 oz).  Estimated body surface area is 1.81 meters squared as calculated from the following:    Height as of this encounter: 5' (1.524 m).    Weight as of this encounter: 84.5 kg (186 lb 4.6 oz).     General: NAD, well appearing  Chest: non-labored breathing  R mastectomy skin with sutures c/d/i, small area of dehiscence  L mastectomy incision healing well  Extremities: Neurovascularly intact      Assessment, Plan: Deja Cano is a 36 year old female s/p BL TE, ADM, now with R mastectomy skin superficial epidermolysis- doing well.    (Z98.890) S/P breast reconstruction, bilateral    - L TE fill: 600 ml (600ml 33S MX)  - 3 sutures removed from R breast, loose with skin well approximated  - no work at this time  - f/u 1 week for suture removal (complete)  - all questions answered    A total of 20 minutes of face-to-face time was spent in this encounter, of which >50% was spent in counseling.     musculoskeletal

## 2023-08-09 NOTE — DISCHARGE NOTE FOR THE EXPIRED PATIENT - TIME PATIENT WAS PRONOUNCED DEAD
19-Apr-2022 06:50 Patient is calling because she got a letter in mail stating it was time for her mammogram. She would like a copy of the order when this has been put in.